# Patient Record
Sex: FEMALE | Race: WHITE | NOT HISPANIC OR LATINO | Employment: OTHER | URBAN - METROPOLITAN AREA
[De-identification: names, ages, dates, MRNs, and addresses within clinical notes are randomized per-mention and may not be internally consistent; named-entity substitution may affect disease eponyms.]

---

## 2017-06-05 ENCOUNTER — RECORDS - HEALTHEAST (OUTPATIENT)
Dept: LAB | Facility: CLINIC | Age: 72
End: 2017-06-05

## 2017-06-05 LAB
CHOLEST SERPL-MCNC: 217 MG/DL
FASTING STATUS PATIENT QL REPORTED: ABNORMAL
HDLC SERPL-MCNC: 78 MG/DL
LDLC SERPL CALC-MCNC: 121 MG/DL
TRIGL SERPL-MCNC: 90 MG/DL

## 2017-11-22 ENCOUNTER — HOSPITAL ENCOUNTER (OUTPATIENT)
Dept: MAMMOGRAPHY | Facility: CLINIC | Age: 72
Discharge: HOME | End: 2017-11-22

## 2017-11-22 ENCOUNTER — COMMUNICATION - HEALTHEAST (OUTPATIENT)
Dept: SCHEDULING | Facility: CLINIC | Age: 72
End: 2017-11-22

## 2017-11-22 DIAGNOSIS — Z12.31 VISIT FOR SCREENING MAMMOGRAM: ICD-10-CM

## 2017-12-07 ENCOUNTER — COMMUNICATION - HEALTHEAST (OUTPATIENT)
Dept: SCHEDULING | Facility: CLINIC | Age: 72
End: 2017-12-07

## 2018-09-10 ENCOUNTER — RECORDS - HEALTHEAST (OUTPATIENT)
Dept: ADMINISTRATIVE | Facility: OTHER | Age: 73
End: 2018-09-10

## 2018-09-19 ENCOUNTER — COMMUNICATION - HEALTHEAST (OUTPATIENT)
Dept: TELEHEALTH | Facility: CLINIC | Age: 73
End: 2018-09-19

## 2018-09-19 ENCOUNTER — HOSPITAL ENCOUNTER (OUTPATIENT)
Dept: CARDIOLOGY | Facility: CLINIC | Age: 73
Discharge: HOME | End: 2018-09-19
Attending: FAMILY MEDICINE

## 2018-09-19 DIAGNOSIS — R07.9 CHEST PAIN AT REST: ICD-10-CM

## 2018-09-19 LAB
CV STRESS CURRENT BP HE: NORMAL
CV STRESS CURRENT HR HE: 104
CV STRESS CURRENT HR HE: 104
CV STRESS CURRENT HR HE: 106
CV STRESS CURRENT HR HE: 106
CV STRESS CURRENT HR HE: 110
CV STRESS CURRENT HR HE: 111
CV STRESS CURRENT HR HE: 112
CV STRESS CURRENT HR HE: 115
CV STRESS CURRENT HR HE: 116
CV STRESS CURRENT HR HE: 120
CV STRESS CURRENT HR HE: 123
CV STRESS CURRENT HR HE: 123
CV STRESS CURRENT HR HE: 126
CV STRESS CURRENT HR HE: 126
CV STRESS CURRENT HR HE: 127
CV STRESS CURRENT HR HE: 127
CV STRESS CURRENT HR HE: 129
CV STRESS CURRENT HR HE: 129
CV STRESS CURRENT HR HE: 71
CV STRESS CURRENT HR HE: 76
CV STRESS CURRENT HR HE: 76
CV STRESS CURRENT HR HE: 77
CV STRESS CURRENT HR HE: 77
CV STRESS CURRENT HR HE: 78
CV STRESS CURRENT HR HE: 79
CV STRESS CURRENT HR HE: 80
CV STRESS CURRENT HR HE: 93
CV STRESS CURRENT HR HE: 96
CV STRESS CURRENT HR HE: 96
CV STRESS CURRENT HR HE: 98
CV STRESS DEVIATION TIME HE: NORMAL
CV STRESS ECHO PERCENT HR HE: NORMAL
CV STRESS EXERCISE STAGE HE: NORMAL
CV STRESS FINAL RESTING BP HE: NORMAL
CV STRESS FINAL RESTING HR HE: 76
CV STRESS MAX HR HE: 132
CV STRESS MAX TREADMILL GRADE HE: 16
CV STRESS MAX TREADMILL SPEED HE: 4.2
CV STRESS PEAK DIA BP HE: NORMAL
CV STRESS PEAK SYS BP HE: NORMAL
CV STRESS PHASE HE: NORMAL
CV STRESS PROTOCOL HE: NORMAL
CV STRESS RESTING PT POSITION HE: NORMAL
CV STRESS ST DEVIATION AMOUNT HE: NORMAL
CV STRESS ST DEVIATION ELEVATION HE: NORMAL
CV STRESS ST EVELATION AMOUNT HE: NORMAL
CV STRESS TEST TYPE HE: NORMAL
CV STRESS TOTAL STAGE TIME MIN 1 HE: NORMAL
ECHO EJECTION FRACTION ESTIMATED: 60 %
STRESS ECHO BASELINE BP: NORMAL
STRESS ECHO BASELINE HR: 78
STRESS ECHO CALCULATED PERCENT HR: 90 %
STRESS ECHO LAST STRESS BP: NORMAL
STRESS ECHO LAST STRESS HR: 129
STRESS ECHO POST ESTIMATED WORKLOAD: 10.7
STRESS ECHO POST EXERCISE DUR MIN: 9
STRESS ECHO POST EXERCISE DUR SEC: 15
STRESS ECHO TARGET HR: 125

## 2018-10-24 ENCOUNTER — RECORDS - HEALTHEAST (OUTPATIENT)
Dept: LAB | Facility: CLINIC | Age: 73
End: 2018-10-24

## 2018-10-24 LAB
ALBUMIN SERPL-MCNC: 3.9 G/DL (ref 3.5–5)
ALP SERPL-CCNC: 78 U/L (ref 45–120)
ALT SERPL W P-5'-P-CCNC: 15 U/L (ref 0–45)
ANION GAP SERPL CALCULATED.3IONS-SCNC: 7 MMOL/L (ref 5–18)
AST SERPL W P-5'-P-CCNC: 21 U/L (ref 0–40)
BILIRUB SERPL-MCNC: 0.9 MG/DL (ref 0–1)
BUN SERPL-MCNC: 12 MG/DL (ref 8–28)
CALCIUM SERPL-MCNC: 9.8 MG/DL (ref 8.5–10.5)
CHLORIDE BLD-SCNC: 95 MMOL/L (ref 98–107)
CHOLEST SERPL-MCNC: 226 MG/DL
CO2 SERPL-SCNC: 30 MMOL/L (ref 22–31)
CREAT SERPL-MCNC: 0.66 MG/DL (ref 0.6–1.1)
FASTING STATUS PATIENT QL REPORTED: YES
GFR SERPL CREATININE-BSD FRML MDRD: >60 ML/MIN/1.73M2
GLUCOSE BLD-MCNC: 105 MG/DL (ref 70–125)
HDLC SERPL-MCNC: 88 MG/DL
LDLC SERPL CALC-MCNC: 117 MG/DL
POTASSIUM BLD-SCNC: 4.2 MMOL/L (ref 3.5–5)
PROT SERPL-MCNC: 6.7 G/DL (ref 6–8)
SODIUM SERPL-SCNC: 132 MMOL/L (ref 136–145)
TRIGL SERPL-MCNC: 106 MG/DL

## 2018-12-03 ENCOUNTER — HOSPITAL ENCOUNTER (OUTPATIENT)
Dept: MAMMOGRAPHY | Facility: CLINIC | Age: 73
Discharge: HOME | End: 2018-12-03
Attending: FAMILY MEDICINE

## 2018-12-03 ENCOUNTER — COMMUNICATION - HEALTHEAST (OUTPATIENT)
Dept: TELEHEALTH | Facility: CLINIC | Age: 73
End: 2018-12-03

## 2018-12-03 DIAGNOSIS — Z12.31 VISIT FOR SCREENING MAMMOGRAM: ICD-10-CM

## 2019-06-24 ASSESSMENT — MIFFLIN-ST. JEOR: SCORE: 1319.15

## 2019-06-28 ENCOUNTER — RECORDS - HEALTHEAST (OUTPATIENT)
Dept: LAB | Facility: CLINIC | Age: 74
End: 2019-06-28

## 2019-06-28 LAB
ANION GAP SERPL CALCULATED.3IONS-SCNC: 9 MMOL/L (ref 5–18)
BUN SERPL-MCNC: 10 MG/DL (ref 8–28)
CALCIUM SERPL-MCNC: 9.7 MG/DL (ref 8.5–10.5)
CHLORIDE BLD-SCNC: 96 MMOL/L (ref 98–107)
CO2 SERPL-SCNC: 27 MMOL/L (ref 22–31)
CREAT SERPL-MCNC: 0.66 MG/DL (ref 0.6–1.1)
GFR SERPL CREATININE-BSD FRML MDRD: >60 ML/MIN/1.73M2
GLUCOSE BLD-MCNC: 85 MG/DL (ref 70–125)
POTASSIUM BLD-SCNC: 3.9 MMOL/L (ref 3.5–5)
SODIUM SERPL-SCNC: 132 MMOL/L (ref 136–145)

## 2019-07-01 ENCOUNTER — RECORDS - HEALTHEAST (OUTPATIENT)
Dept: ADMINISTRATIVE | Facility: OTHER | Age: 74
End: 2019-07-01

## 2019-07-05 ENCOUNTER — RECORDS - HEALTHEAST (OUTPATIENT)
Dept: ADMINISTRATIVE | Facility: OTHER | Age: 74
End: 2019-07-05

## 2019-07-09 ASSESSMENT — MIFFLIN-ST. JEOR: SCORE: 1227.99

## 2019-07-11 ENCOUNTER — ANESTHESIA - HEALTHEAST (OUTPATIENT)
Dept: SURGERY | Facility: CLINIC | Age: 74
End: 2019-07-11

## 2019-07-11 ENCOUNTER — RECORDS - HEALTHEAST (OUTPATIENT)
Dept: ADMINISTRATIVE | Facility: OTHER | Age: 74
End: 2019-07-11

## 2019-07-12 ENCOUNTER — SURGERY - HEALTHEAST (OUTPATIENT)
Dept: SURGERY | Facility: CLINIC | Age: 74
End: 2019-07-12

## 2019-07-12 ASSESSMENT — MIFFLIN-ST. JEOR: SCORE: 1205.76

## 2019-07-14 ASSESSMENT — MIFFLIN-ST. JEOR: SCORE: 1205.76

## 2019-12-17 ENCOUNTER — HOSPITAL ENCOUNTER (OUTPATIENT)
Dept: MAMMOGRAPHY | Facility: CLINIC | Age: 74
Discharge: HOME | End: 2019-12-17
Attending: FAMILY MEDICINE

## 2019-12-17 DIAGNOSIS — Z12.31 VISIT FOR SCREENING MAMMOGRAM: ICD-10-CM

## 2020-05-20 ENCOUNTER — RECORDS - HEALTHEAST (OUTPATIENT)
Dept: ADMINISTRATIVE | Facility: OTHER | Age: 75
End: 2020-05-20

## 2020-07-17 ENCOUNTER — RECORDS - HEALTHEAST (OUTPATIENT)
Dept: LAB | Facility: CLINIC | Age: 75
End: 2020-07-17

## 2020-07-17 LAB
ANION GAP SERPL CALCULATED.3IONS-SCNC: 10 MMOL/L (ref 5–18)
BUN SERPL-MCNC: 11 MG/DL (ref 8–28)
CALCIUM SERPL-MCNC: 9.3 MG/DL (ref 8.5–10.5)
CHLORIDE BLD-SCNC: 101 MMOL/L (ref 98–107)
CHOLEST SERPL-MCNC: 217 MG/DL
CO2 SERPL-SCNC: 24 MMOL/L (ref 22–31)
CREAT SERPL-MCNC: 0.71 MG/DL (ref 0.6–1.1)
FASTING STATUS PATIENT QL REPORTED: NO
GFR SERPL CREATININE-BSD FRML MDRD: >60 ML/MIN/1.73M2
GLUCOSE BLD-MCNC: 114 MG/DL (ref 70–125)
HDLC SERPL-MCNC: 81 MG/DL
LDLC SERPL CALC-MCNC: 114 MG/DL
POTASSIUM BLD-SCNC: 4.5 MMOL/L (ref 3.5–5)
SODIUM SERPL-SCNC: 135 MMOL/L (ref 136–145)
TRIGL SERPL-MCNC: 110 MG/DL

## 2020-10-21 ENCOUNTER — RECORDS - HEALTHEAST (OUTPATIENT)
Dept: LAB | Facility: CLINIC | Age: 75
End: 2020-10-21

## 2020-10-21 LAB — PTH-INTACT SERPL-MCNC: 42 PG/ML (ref 10–86)

## 2020-10-22 LAB — 25(OH)D3 SERPL-MCNC: 44 NG/ML (ref 30–80)

## 2020-12-17 ENCOUNTER — RECORDS - HEALTHEAST (OUTPATIENT)
Dept: SCHEDULING | Facility: CLINIC | Age: 75
End: 2020-12-17

## 2020-12-17 DIAGNOSIS — Z12.31 VISIT FOR SCREENING MAMMOGRAM: ICD-10-CM

## 2021-05-24 ENCOUNTER — RECORDS - HEALTHEAST (OUTPATIENT)
Dept: ADMINISTRATIVE | Facility: CLINIC | Age: 76
End: 2021-05-24

## 2021-05-25 ENCOUNTER — RECORDS - HEALTHEAST (OUTPATIENT)
Dept: ADMINISTRATIVE | Facility: CLINIC | Age: 76
End: 2021-05-25

## 2021-05-26 ENCOUNTER — RECORDS - HEALTHEAST (OUTPATIENT)
Dept: ADMINISTRATIVE | Facility: CLINIC | Age: 76
End: 2021-05-26

## 2021-05-30 NOTE — ANESTHESIA PROCEDURE NOTES
Spinal Block    Patient location during procedure: OR  Start time: 7/12/2019 10:32 AM  End time: 7/12/2019 10:37 AM  Reason for block: primary anesthetic    Staffing:  Performing  Anesthesiologist: Erasto Menendez MD    Preanesthetic Checklist  Completed: patient identified, risks, benefits, and alternatives discussed, timeout performed, consent obtained, airway assessed, oxygen available, suction available, emergency drugs available and hand hygiene performed  Spinal Block  Patient position: sitting  Prep: ChloraPrep  Patient monitoring: heart rate, cardiac monitor, continuous pulse ox and blood pressure  Approach: midline  Location: L3-4  Injection technique: single-shot  Needle type: pencil-tip   Needle gauge: 24 G

## 2021-05-30 NOTE — ANESTHESIA CARE TRANSFER NOTE
Last vitals:   Vitals:    07/12/19 1222   BP: 152/67   Pulse: 88   Resp: 14   Temp: 37  C (98.6  F)   SpO2: 98%     Patient's level of consciousness is awake  Spontaneous respirations: yes  Maintains airway independently: yes  Dentition unchanged: yes  Oropharynx: oropharynx clear of all foreign objects    QCDR Measures:  ASA# 20 - Surgical Safety Checklist: WHO surgical safety checklist completed prior to induction    PQRS# 430 - Adult PONV Prevention: 4558F - Pt received => 2 anti-emetic agents (different classes) preop & intraop  ASA# 8 - Peds PONV Prevention: NA - Not pediatric patient, not GA or 2 or more risk factors NOT present  PQRS# 424 - Trinity-op Temp Management: 4559F - At least one body temp DOCUMENTED => 35.5C or 95.9F within required timeframe  PQRS# 426 - PACU Transfer Protocol: - Transfer of care checklist used  ASA# 14 - Acute Post-op Pain: ASA14B - Patient did NOT experience pain >= 7 out of 10

## 2021-05-30 NOTE — ANESTHESIA PREPROCEDURE EVALUATION
Anesthesia Evaluation      Patient summary reviewed   No history of anesthetic complications     Airway   Mallampati: I  Neck ROM: full   Pulmonary - negative ROS and normal exam                          Cardiovascular - negative ROS and normal exam  (+) hypertension well controlled, , hypercholesterolemia,      Neuro/Psych - negative ROS     Endo/Other - negative ROS   (+) arthritis,      GI/Hepatic/Renal - negative ROS           Dental - normal exam                        Anesthesia Plan  Planned anesthetic: spinal    ASA 2     Anesthetic plan and risks discussed with: patient    Post-op plan: routine recovery

## 2021-05-30 NOTE — ANESTHESIA POSTPROCEDURE EVALUATION
Patient: Isela Seo  RIGHT DIRECT ANTERIOR TOTAL HIP ARTHROPLASTY  Anesthesia type: spinal    Patient location: PACU  Last vitals:   Vitals Value Taken Time   /63 7/12/2019  1:00 PM   Temp 37.2  C (98.9  F) 7/12/2019 12:50 PM   Pulse 80 7/12/2019  1:02 PM   Resp 15 7/12/2019  1:02 PM   SpO2 94 % 7/12/2019  1:02 PM   Vitals shown include unvalidated device data.  Post vital signs: stable  Level of consciousness: awake and responds to simple questions  Post-anesthesia pain: pain controlled  Post-anesthesia nausea and vomiting: no  Pulmonary: unassisted, return to baseline  Cardiovascular: stable and blood pressure at baseline  Hydration: adequate  Anesthetic events: no    QCDR Measures:  ASA# 11 - Trinity-op Cardiac Arrest: ASA11B - Patient did NOT experience unanticipated cardiac arrest  ASA# 12 - Trinity-op Mortality Rate: ASA12B - Patient did NOT die  ASA# 13 - PACU Re-Intubation Rate: NA - No ETT / LMA used for case  ASA# 10 - Composite Anes Safety: ASA10A - No serious adverse event    Additional Notes:

## 2021-06-01 ENCOUNTER — RECORDS - HEALTHEAST (OUTPATIENT)
Dept: ADMINISTRATIVE | Facility: CLINIC | Age: 76
End: 2021-06-01

## 2021-06-02 ENCOUNTER — RECORDS - HEALTHEAST (OUTPATIENT)
Dept: ADMINISTRATIVE | Facility: CLINIC | Age: 76
End: 2021-06-02

## 2021-06-03 VITALS — HEIGHT: 65 IN | WEIGHT: 156.5 LBS | BODY MASS INDEX: 26.08 KG/M2

## 2021-06-16 PROBLEM — M16.9 HIP OSTEOARTHRITIS: Status: ACTIVE | Noted: 2019-07-12

## 2021-07-13 ENCOUNTER — RECORDS - HEALTHEAST (OUTPATIENT)
Dept: ADMINISTRATIVE | Facility: CLINIC | Age: 76
End: 2021-07-13

## 2021-07-21 ENCOUNTER — RECORDS - HEALTHEAST (OUTPATIENT)
Dept: ADMINISTRATIVE | Facility: CLINIC | Age: 76
End: 2021-07-21

## 2021-07-22 ENCOUNTER — RECORDS - HEALTHEAST (OUTPATIENT)
Dept: SCHEDULING | Facility: CLINIC | Age: 76
End: 2021-07-22

## 2021-07-22 DIAGNOSIS — Z12.31 OTHER SCREENING MAMMOGRAM: ICD-10-CM

## 2021-09-13 ENCOUNTER — LAB REQUISITION (OUTPATIENT)
Dept: LAB | Facility: CLINIC | Age: 76
End: 2021-09-13
Payer: COMMERCIAL

## 2021-09-13 DIAGNOSIS — E78.5 HYPERLIPIDEMIA, UNSPECIFIED: ICD-10-CM

## 2021-09-13 DIAGNOSIS — I10 ESSENTIAL (PRIMARY) HYPERTENSION: ICD-10-CM

## 2021-09-13 LAB
ANION GAP SERPL CALCULATED.3IONS-SCNC: 9 MMOL/L (ref 5–18)
BUN SERPL-MCNC: 12 MG/DL (ref 8–28)
CALCIUM SERPL-MCNC: 9.6 MG/DL (ref 8.5–10.5)
CHLORIDE BLD-SCNC: 100 MMOL/L (ref 98–107)
CHOLEST SERPL-MCNC: 211 MG/DL
CO2 SERPL-SCNC: 26 MMOL/L (ref 22–31)
CREAT SERPL-MCNC: 0.72 MG/DL (ref 0.6–1.1)
GFR SERPL CREATININE-BSD FRML MDRD: 82 ML/MIN/1.73M2
GLUCOSE BLD-MCNC: 103 MG/DL (ref 70–125)
HDLC SERPL-MCNC: 87 MG/DL
LDLC SERPL CALC-MCNC: 102 MG/DL
POTASSIUM BLD-SCNC: 4.2 MMOL/L (ref 3.5–5)
SODIUM SERPL-SCNC: 135 MMOL/L (ref 136–145)
TRIGL SERPL-MCNC: 109 MG/DL

## 2021-09-13 PROCEDURE — 80061 LIPID PANEL: CPT | Mod: ORL | Performed by: FAMILY MEDICINE

## 2021-09-13 PROCEDURE — 80048 BASIC METABOLIC PNL TOTAL CA: CPT | Mod: ORL | Performed by: FAMILY MEDICINE

## 2021-09-13 PROCEDURE — 36415 COLL VENOUS BLD VENIPUNCTURE: CPT | Mod: ORL | Performed by: FAMILY MEDICINE

## 2022-08-13 ENCOUNTER — OFFICE VISIT (OUTPATIENT)
Dept: FAMILY MEDICINE | Facility: CLINIC | Age: 77
End: 2022-08-13
Payer: COMMERCIAL

## 2022-08-13 VITALS
BODY MASS INDEX: 24.13 KG/M2 | WEIGHT: 145 LBS | RESPIRATION RATE: 20 BRPM | SYSTOLIC BLOOD PRESSURE: 98 MMHG | TEMPERATURE: 98.8 F | OXYGEN SATURATION: 97 % | HEART RATE: 81 BPM | DIASTOLIC BLOOD PRESSURE: 56 MMHG

## 2022-08-13 DIAGNOSIS — H61.21 IMPACTED CERUMEN OF RIGHT EAR: Primary | ICD-10-CM

## 2022-08-13 PROCEDURE — 69209 REMOVE IMPACTED EAR WAX UNI: CPT | Mod: RT | Performed by: PREVENTIVE MEDICINE

## 2022-08-13 RX ORDER — SERTRALINE HYDROCHLORIDE 25 MG/1
25 TABLET, FILM COATED ORAL DAILY
COMMUNITY
Start: 2022-05-03

## 2022-08-13 RX ORDER — LOSARTAN POTASSIUM 50 MG/1
50 TABLET ORAL DAILY
COMMUNITY
Start: 2022-05-03

## 2022-08-13 NOTE — PROGRESS NOTES
Assessment & Plan     (H61.21) Impacted cerumen of right ear  (primary encounter diagnosis)  Consider debrox in the future    MA used ear irrigation to remove cerumen without problem.  Patient tolerated procedure well.  There were no complications.       31 minutes spent outside of the procedure on the date of the encounter doing chart review, review of outside records, patient visit and documentation         No follow-ups on file.    Luis Fernando Cervantes MD  [facility]    Subjective     Isela Seo is a 77 year old year old female who presents to clinic today for the following health issues:    Patient presents with:  Cerumen Impaction: Wax removal right ear     This is a 78 yo female who has a history of recurrent wax in her ear that she needs 'flushed out'.  Has been bothering her in the right ear for the past 2 days.  Otherwise feels well.    Patient Active Problem List   Diagnosis     Hip osteoarthritis       Current Outpatient Medications   Medication     aspirin 325 MG tablet     Calcium-Vitamin D (CALCIUM 500/D PO)     fish oil-omega-3 fatty acids 1000 MG capsule     losartan (COZAAR) 50 MG tablet     Multiple Vitamin (MULTIVITAMIN) per tablet     sertraline (ZOLOFT) 25 MG tablet     simvastatin (ZOCOR) 10 MG tablet     No current facility-administered medications for this visit.       No past medical history on file.    Social History   reports that she quit smoking about 34 years ago. She quit after 20.00 years of use. She has never used smokeless tobacco. She reports current alcohol use of about 7.0 - 14.0 standard drinks of alcohol per week. She reports that she does not use drugs.    Family History   Problem Relation Age of Onset     Breast Cancer Maternal Grandmother 90.00     Breast Cancer Other        Review of Systems  Constitutional, HEENT, cardiovascular, pulmonary, GI, , musculoskeletal, neuro, skin, endocrine and psych systems are negative, except as  otherwise noted.      Objective    BP 98/56 (BP Location: Right arm, Patient Position: Sitting, Cuff Size: Adult Regular)   Pulse 81   Temp 98.8  F (37.1  C) (Oral)   Resp 20   Wt 65.8 kg (145 lb)   SpO2 97%   BMI 24.13 kg/m    Physical Exam   GENERAL: healthy, alert and no distress  EYES: Eyes grossly normal to inspection, PERRL and conjunctivae and sclerae normal  HENT: l ear canal and b TM's normal, nose and mouth without ulcers or lesions, r ear canal obstructed with cerumen and, once clear, appears normal.  NECK: no adenopathy, no asymmetry, masses, or scars and thyroid normal to palpation  RESP: lungs clear to auscultation - no rales, rhonchi or wheezes  CV: regular rate and rhythm, normal S1 S2, no S3 or S4, no murmur, click or rub, no peripheral edema and peripheral pulses strong  MS: no gross musculoskeletal defects noted, no edema  SKIN: no suspicious lesions or rashes  NEURO: Normal strength and tone, mentation intact and speech normal  PSYCH: mentation appears normal, affect normal/bright

## 2025-01-15 RX ORDER — LANOLIN ALCOHOL/MO/W.PET/CERES
1000 CREAM (GRAM) TOPICAL DAILY
COMMUNITY

## 2025-01-15 RX ORDER — DONEPEZIL HYDROCHLORIDE 10 MG/1
10 TABLET, FILM COATED ORAL AT BEDTIME
COMMUNITY

## 2025-01-15 RX ORDER — FERROUS SULFATE 325(65) MG
325 TABLET ORAL
COMMUNITY

## 2025-01-15 NOTE — H&P (VIEW-ONLY)
[facility]      Preoperative Consultation   Isela Seo   : 1945   Gender: female    Date of Encounter: 1/15/2025    Nursing Notes:   Arabella Aly CMA  1/15/2025 10:30 AM  Signed  Chief Complaint   Patient presents with     Pre-Op Exam     Pre-op DOS 25, Rt total knee replacement, [facility]. Fax [phone]       Additional visit information (chief complaint/health maintenance) shared by patient:     Health Maintenance Due   Topic Date Due     RSV vaccine for adults or pregnancy (1 - 1-dose 75+ series) Never done     Influenza for age 65+  2024     Medicare Wellness for age 65+  2024     Depression screening for age 12+  2025       Health maintenance reviewed with patient Yes    Patient presents for an in-person office visit: alone  Communication Method: Patient is active on [employer] and has been instructed that results/communications will be made via [employer]  If a phone call is needed, the preferred number is:  Mobile or Home  Home Phone [phone]   Mobile [phone]   Mobile [phone]     May we leave a detailed message at this number? Yes  Arabella Aly CMA.....1/15/2025 10:02 AM     Chief Complaint   Patient presents with     Pre-Op Exam     Pre-op DOS 25, Rt total knee replacement, [facility]. Fax [phone]       Health Maintenance Due   Topic Date Due     RSV vaccine for adults or pregnancy (1 - 1-dose 75+ series) Never done     Influenza for age 65+  2024     Medicare Wellness for age 65+  2024     Depression screening for age 12+  2025       Islea Seo is a 79 y.o. female (1945) who presents for Rt total knee replacement    Date of Surgery: 25  Surgical Specialty: Orthopedic  Harsha Marx MD - [facility]/Surgical Facility: [facility]  Surgery type: inpatient  Primary Physician: Selene  Johanna Aly, [facility] ....................  1/15/2025   10:03 AM          History of Present Illness     78 y/o F with h/o HTN, HLP, dementia, lung cancer s/p right lower lobectomy, previous breast cancer, osteoporosis and osteoarthritis that presents for pre-operative evaluation for knee replacement surgery.    Osteoarthritis: has progressed to the point it is limiting her activity. Previously was golfing and walking almost 3 miles a day in the summer, but was not able to do this last summer and wants to stay active. Tried conservative management with injections and was not very helpful.    Dementia: Following with Dr. Walters. Diagnosed with MCI in 2022. SLUMS in 2022 was 25/30 -> down to 19/30 in Feb of 2024 and had follow-up neuropsych testing in March that diagnosed major neurocognitive disorder secondary to alzheimer's disease. She was started on donepezil in 2024.     HTN: is currently taking losartan 50 mg once a day. No side effects.      HLP: is currently taking simvastatin 40 mg once a day.      Lung changes: history of lung cancer s/p right lower lobe lobectomy in 2002. She was seen in the [facility] 5/2020 and had a CT scan that showed 2 ground glass opacities in the left upper lung that were a little larger than previous scans. Following with Dr. Sparks. Last CT in 10/2024 with some enlargement suspicious for a slow-growing adenocarcinoma. Plans f/u in April.    URI symptoms: Cough, sniffles, voice deeper for last few days. No fevers. Doesn't really feel sick. No sick contacts.    Advanced Care Planning: requesting to fill out a POLST form today. Just completed an advanced directive and copy given for the chart. Discussion with both Isela and her daughter, Brittany, who is her health care power of . Isela would like to live as long as possible as long as she has good quality of life. She chooses to be DNR/I. She would be ok with non-invasive respiratory support such  as CPAP or Bipap if it was felt she may have a reversible condition and could return to her previous level of functioning. Limited trial of a feeding tube again if reversible condition. Ok with IV and PO antibiotics. She does not want to do treatments or procedures that would cause her to linger with a likely terminal condition. Attempted serious illness questions, but was difficult with dementia.     Review of Systems   A comprehensive review of systems was negative except for items noted in HPI.    Patient Active Problem List   Diagnosis Code     Polyp of colon K63.5     Hip osteoarthritis M16.9     Hemorrhoids K64.9     Diverticular disease of large intestine K57.30     HTN (hypertension) I10     Mixed hyperlipidemia E78.2     Age-related osteoporosis without current pathological fracture M81.0     History of lung cancer Z85.118     History of breast cancer Z85.3     Alzheimer's dementia without behavioral disturbance (HC) G30.9, F02.80     Major neurocognitive disorder (HC) F03.90     Skin cancer C44.90     Current Outpatient Medications   Medication Sig     donepeziL (ARICEPT) 10 mg tablet Take 1 Tablet (10 mg) by mouth at bedtime.     losartan (COZAAR) 50 mg tablet Take 1 Tablet (50 mg) by mouth once daily.     medication order composer Vitamin B12, calcium & iron     sertraline (ZOLOFT) 25 mg tablet Take 1 Tablet (25 mg) by mouth once daily.     simvastatin (ZOCOR) 20 mg tablet Take 1 Tablet (20 mg) by mouth at bedtime.     No current facility-administered medications for this visit.     Medications have been reviewed by me and are current to the best of my knowledge and ability.     No Known Allergies  Past Surgical History:   . Laterality Date     (IA) FL FREEING BOWEL ADHESION ENTEROLYSIS  1999     BREAST BIOPSY       CATARACT EXTRACTION Bilateral 2011     HIP ARTHROPLASTY Right 07/15/2019     KNEE CARTILAGE SURGERY Left 10/14/2010     LAPAROSCOPIC OVARIAN CYSTECTOMY      for infertility     LOBECTOMY  "Right 2002    right lower lobe for lung cancer     AK TONSILLECTOMY & ADENOIDECTOMY <AGE 12       Social History     Tobacco Use     Smoking status: Former     Types: Cigarettes     Smokeless tobacco: Never     Tobacco comments:     Quit 1985   Vaping Use     Vaping status: Never Used   Substance Use Topics     Alcohol use: Yes     Alcohol/week: 2.0 standard drinks of alcohol     Types: 2 Cans of beer per week     Comment: 2 beer every night      Drug use: No     Family History   Problem Relation Age of Onset     Stroke Mother      Hypertension Mother      Gout Father      Arthritis Father      Kidney failure Father      Arthritis Sister      Cancer-breast Maternal Grandmother      PAST DIFFICULTY WITH ANESTHESIA: Yes, personal hx of delirium after hip surgery      Physical Exam   /70 (Cuff Site: Left Arm, Position: Sitting, Cuff Size: Adult Regular)   Pulse 72   Temp 97.6  F (36.4  C)   Ht 1.651 m (5' 5\")   Wt 70.3 kg (155 lb)   SpO2 99%   BMI 25.79 kg/m   Body mass index is 25.79 kg/m .  General Appearance: Pleasant, alert, appropriate appearance for age. No acute distress  Head Exam: Normocephalic, without obvious abnormality.  Eye Exam: Normal external eye, conjunctiva, lids, cornea. STEPHANIE.  Ear Exam: Normal TM's bilaterally. Normal auditory canals and external ears. Non-tender.  Nose Exam: Normal external nose, mucous membranes, and septum.  OroPharynx Exam: Dental hygiene adequate. Normal buccal mucosa. Normal pharynx.  Neck Exam: Supple, no masses or nodes.  Chest/Respiratory Exam: Normal chest wall and respirations. Clear to auscultation.  Cardiovascular Exam: Regular rate and rhythm. S1, S2, 3/6 systolic murmur loudest at RUSB.  Gastrointestinal Exam: Soft, nontender, no abnormal masses or organomegaly.  Lymphatic Exam: Normal.  Musculoskeletal Exam: Back is straight and non-tender, full ROM of upper and lower extremities.  Skin: no rash or abnormalities  Neurologic Exam: Nonfocal; normal gross " motor movement, tone, and coordination. No tremor.  Psychiatric Exam: Alert and oriented, appropriate affect.     Assessment / Plan     The Pre-Op Tool    Recommendations      Intermediate Risk Procedure    Risk of CV Complication (RCRI)  0.5%    Current Cardiac Status  Good exertional capacity ( > 4 mets )    Cardiac History  No history of coronary artery disease           Labs  HGB within last 30 days  Potassium within last 30 days  EKG  Baseline EKG within the last 12 months  CXR  Not indicated    Stress Testing  Not indicated    * Testing recommendations are intended to assist, but not direct, clinical decisions.           Type & Screen should be obtained by Anesthesia only if the risk of transfusion is > 5% for the procedure         Hold Losartan (Cozaar) the evening before and/or morning of the procedure.  Take your other medications as usual prior to the procedure  Hold vitamins and/or supplements for 1 week prior to the procedure  Hold fish oil for 2 weeks prior to the procedure  Okay to take Acetaminophen (Tylenol) up until the procedure  Hold / avoid NSAIDs (e.g. ibuprofen, naproxen) prior to procedure: 2 days for ibuprofen (Advil) and 4 days for naproxen (Aleve).    * Medication recommendations are not intended to be exhaustive; they are limited to common medications that are potentially dangerous if incorrectly managed          Labs  * Data supports elimination of  routine  laboratory testing in favor of focused,  indicated  testing based on medical co-morbidities. A 2009 study randomized 1061 patients undergoing ambulatory, non-cataract surgery to routine or to indicated testing. Perioperative adverse events were similar (Anesthesia & Analgesia 2009;108:467-75; Anesthesiol. Clin. 2016 Mar;34(1):43-58).  Stress Testing  * The current ACC/AHA guideline states that 'non-invasive stress testing is not useful for patients [with no clinical risk factors] undergoing noncardiac surgery' (JACC.  2014;6421);e1-76.).  RAAS Antagonist  * Hypotension during anesthesia is associated with continuing renin-angiotensin system (RAAS) antagonist. While it is unclear if holding RAAS antagonists reduces postoperative complications, in most circumstances our experts recommend holding RAAS antagonist 24 hours prior to surgery. (Postgrad Med J 2011;87:472-81; Anest 2017;126:16-27; BMC Anesthesiol 2018;18:26.)     Session ID: [id number]  Endnotes and bibliography available upon request: [email or web address]  Labs: yes  ECG: yes - NSR, RBBB, unchanged from 2018    ICD-10-CM    1. Preoperative examination  Z01.818 CBC W PLT NO DIFF      2. Primary osteoarthritis of right knee  M17.11 CBC W PLT NO DIFF      3. Alzheimer's dementia without behavioral disturbance (HC)  G30.9 Dementia increases her risk for delirium in the post-operative period. Some recommendations to help minimize this, include:  - frequent orientation to time and place  - adequate pain control  - maintaining a consistent sleep schedule  - early immobilization  - addressing any sensory impairments (hearing/vision)  - minimize unnecessary medication  - Family involvement    F02.80       4. HTN (hypertension)  I10 BASIC METABOLIC PANEL     DC READING EKG - NO CHARGE, COMP ONLY     EKG 12 LEAD     DC ECG ROUTINE ECG W/LEAST 12 LDS W/I&R     losartan (COZAAR) 50 mg tablet      5. Anxiety  F41.9 sertraline (ZOLOFT) 25 mg tablet      6. Mixed hyperlipidemia  E78.2 simvastatin (ZOCOR) 20 mg tablet      7. Major neurocognitive disorder (HC)  F03.90 donepeziL (ARICEPT) 10 mg tablet      8. Heart murmur  R01.1 Heart murmur today louder than in the past. Previous stress ECHO in 2018 without any valvular abnormalities. I recommend we obtain an echocardiogram prior to surgery to make sure she does not have any significant valvular disease that has developed that may complicate the surgery.  ECHO TRANSTHORACIC COMPLETE      9.  Rhinorrhea: Has some very mild possible viral URI symptoms today. She does not feel sick. It is possible these are related to the cold weather as well. Will monitor symptoms and if improving, ok for surgery. If worsening or not improving, then would push back surgery.   10. Advanced Care Planing - Serious illness conversation done to extent able with dementia and POLST form completed.    Total time preparing to see this patient, face-to-face time, and coordinating care time on the same calendar date: 54 minutes. 22 minutes of this time was spent on advanced care planning and completing a POLST.    Patient is cleared, pending tests ordered today, for planned procedure.   Electronically Signed by:     Johanna Morton MD  Internal Medicine/Pediatrics  [facility]    1/15/2025

## 2025-01-16 RX ORDER — SIMVASTATIN 20 MG
20 TABLET ORAL AT BEDTIME
COMMUNITY

## 2025-01-16 RX ORDER — SENNOSIDES 8.6 MG
650 CAPSULE ORAL DAILY
COMMUNITY

## 2025-01-16 NOTE — PROGRESS NOTES
Planning to discharge home on POD 1 in the morning with her daughter helping her and staying with her after surgery.       01/16/25 [id number]   Discharge Planning   Patient/Family Anticipates Transition to home with family  (outpatient PT arranged at [facility])   Concerns to be Addressed all concerns addressed in this encounter   Living Arrangements   People in Home alone   Type of Residence Private Residence   Is your private residence a single family home or apartment? Apartment   Number of Stairs, Within Home, Primary none  (elevator)   Once home, are you able to live on one level? Yes   Which level? Main Level   Bathroom Shower/Tub Walk-in shower   Equipment Currently Used at Home grab bar, toilet;shower chair;cane, straight  (Has a walker at home)   Support System   Support Systems Children  (daughter, Brittany Seo, will be staying with her for a week after surgery)   Do you have someone available to stay with you one or two nights once you are home? Yes   Education   Patient attended total joint pre-op class/received pre-op teaching  email/phone call

## 2025-01-22 ENCOUNTER — ANESTHESIA EVENT (OUTPATIENT)
Dept: SURGERY | Facility: CLINIC | Age: 80
End: 2025-01-22
Payer: COMMERCIAL

## 2025-01-23 ENCOUNTER — DOCUMENTATION ONLY (OUTPATIENT)
Dept: OTHER | Facility: CLINIC | Age: 80
End: 2025-01-23
Payer: COMMERCIAL

## 2025-01-23 ENCOUNTER — APPOINTMENT (OUTPATIENT)
Dept: PHYSICAL THERAPY | Facility: CLINIC | Age: 80
End: 2025-01-23
Attending: ORTHOPAEDIC SURGERY
Payer: COMMERCIAL

## 2025-01-23 ENCOUNTER — HOSPITAL ENCOUNTER (OUTPATIENT)
Facility: CLINIC | Age: 80
End: 2025-01-23
Attending: ORTHOPAEDIC SURGERY | Admitting: ORTHOPAEDIC SURGERY
Payer: COMMERCIAL

## 2025-01-23 ENCOUNTER — ANESTHESIA (OUTPATIENT)
Dept: SURGERY | Facility: CLINIC | Age: 80
End: 2025-01-23
Payer: COMMERCIAL

## 2025-01-23 VITALS
DIASTOLIC BLOOD PRESSURE: 66 MMHG | HEART RATE: 71 BPM | HEIGHT: 65 IN | RESPIRATION RATE: 17 BRPM | BODY MASS INDEX: 25.83 KG/M2 | OXYGEN SATURATION: 93 % | WEIGHT: 155 LBS | TEMPERATURE: 98.3 F | SYSTOLIC BLOOD PRESSURE: 141 MMHG

## 2025-01-23 DIAGNOSIS — M17.11 PRIMARY OSTEOARTHRITIS OF RIGHT KNEE: Primary | ICD-10-CM

## 2025-01-23 LAB
ANION GAP SERPL CALCULATED.3IONS-SCNC: 10 MMOL/L (ref 7–15)
BUN SERPL-MCNC: 8.8 MG/DL (ref 8–23)
CALCIUM SERPL-MCNC: 9.1 MG/DL (ref 8.8–10.4)
CHLORIDE SERPL-SCNC: 98 MMOL/L (ref 98–107)
CREAT SERPL-MCNC: 0.56 MG/DL (ref 0.51–0.95)
EGFRCR SERPLBLD CKD-EPI 2021: >90 ML/MIN/1.73M2
GLUCOSE SERPL-MCNC: 143 MG/DL (ref 70–99)
HCO3 SERPL-SCNC: 25 MMOL/L (ref 22–29)
HOLD SPECIMEN: NORMAL
MAGNESIUM SERPL-MCNC: 2 MG/DL (ref 1.7–2.3)
POTASSIUM SERPL-SCNC: 4.2 MMOL/L (ref 3.4–5.3)
SODIUM SERPL-SCNC: 133 MMOL/L (ref 135–145)

## 2025-01-23 PROCEDURE — 250N000011 HC RX IP 250 OP 636: Performed by: ANESTHESIOLOGY

## 2025-01-23 PROCEDURE — 258N000003 HC RX IP 258 OP 636: Performed by: ANESTHESIOLOGY

## 2025-01-23 PROCEDURE — 250N000013 HC RX MED GY IP 250 OP 250 PS 637: Performed by: PHYSICIAN ASSISTANT

## 2025-01-23 PROCEDURE — 80048 BASIC METABOLIC PNL TOTAL CA: CPT | Performed by: FAMILY MEDICINE

## 2025-01-23 PROCEDURE — 370N000017 HC ANESTHESIA TECHNICAL FEE, PER MIN: Performed by: ORTHOPAEDIC SURGERY

## 2025-01-23 PROCEDURE — 250N000011 HC RX IP 250 OP 636: Performed by: PHYSICIAN ASSISTANT

## 2025-01-23 PROCEDURE — 250N000009 HC RX 250: Performed by: PHYSICIAN ASSISTANT

## 2025-01-23 PROCEDURE — 97161 PT EVAL LOW COMPLEX 20 MIN: CPT | Mod: GP

## 2025-01-23 PROCEDURE — 250N000011 HC RX IP 250 OP 636: Performed by: ORTHOPAEDIC SURGERY

## 2025-01-23 PROCEDURE — 250N000013 HC RX MED GY IP 250 OP 250 PS 637: Performed by: FAMILY MEDICINE

## 2025-01-23 PROCEDURE — 999N000141 HC STATISTIC PRE-PROCEDURE NURSING ASSESSMENT: Performed by: ORTHOPAEDIC SURGERY

## 2025-01-23 PROCEDURE — 83735 ASSAY OF MAGNESIUM: CPT | Performed by: FAMILY MEDICINE

## 2025-01-23 PROCEDURE — 250N000013 HC RX MED GY IP 250 OP 250 PS 637: Performed by: ORTHOPAEDIC SURGERY

## 2025-01-23 PROCEDURE — C1713 ANCHOR/SCREW BN/BN,TIS/BN: HCPCS | Performed by: ORTHOPAEDIC SURGERY

## 2025-01-23 PROCEDURE — 258N000003 HC RX IP 258 OP 636: Performed by: NURSE ANESTHETIST, CERTIFIED REGISTERED

## 2025-01-23 PROCEDURE — 710N000010 HC RECOVERY PHASE 1, LEVEL 2, PER MIN: Performed by: ORTHOPAEDIC SURGERY

## 2025-01-23 PROCEDURE — C1776 JOINT DEVICE (IMPLANTABLE): HCPCS | Performed by: ORTHOPAEDIC SURGERY

## 2025-01-23 PROCEDURE — 82565 ASSAY OF CREATININE: CPT | Performed by: FAMILY MEDICINE

## 2025-01-23 PROCEDURE — 272N000001 HC OR GENERAL SUPPLY STERILE: Performed by: ORTHOPAEDIC SURGERY

## 2025-01-23 PROCEDURE — 250N000009 HC RX 250: Performed by: NURSE ANESTHETIST, CERTIFIED REGISTERED

## 2025-01-23 PROCEDURE — 36415 COLL VENOUS BLD VENIPUNCTURE: CPT | Performed by: FAMILY MEDICINE

## 2025-01-23 PROCEDURE — 99222 1ST HOSP IP/OBS MODERATE 55: CPT | Performed by: FAMILY MEDICINE

## 2025-01-23 PROCEDURE — 64447 NJX AA&/STRD FEMORAL NRV IMG: CPT | Mod: RT,XU

## 2025-01-23 PROCEDURE — 97116 GAIT TRAINING THERAPY: CPT | Mod: GP

## 2025-01-23 PROCEDURE — 360N000077 HC SURGERY LEVEL 4, PER MIN: Performed by: ORTHOPAEDIC SURGERY

## 2025-01-23 PROCEDURE — 250N000009 HC RX 250: Performed by: ORTHOPAEDIC SURGERY

## 2025-01-23 PROCEDURE — 97110 THERAPEUTIC EXERCISES: CPT | Mod: GP

## 2025-01-23 PROCEDURE — 250N000011 HC RX IP 250 OP 636: Performed by: NURSE ANESTHETIST, CERTIFIED REGISTERED

## 2025-01-23 PROCEDURE — 258N000003 HC RX IP 258 OP 636: Performed by: FAMILY MEDICINE

## 2025-01-23 DEVICE — IMP INSERT ARTICULAR S&N LGN CR XLPE SZ3-4 11MM 71453112: Type: IMPLANTABLE DEVICE | Site: KNEE | Status: FUNCTIONAL

## 2025-01-23 DEVICE — IMP BASEPLATE TIBIAL GENESIS II SZ 4 RT TI 71420184: Type: IMPLANTABLE DEVICE | Site: KNEE | Status: FUNCTIONAL

## 2025-01-23 DEVICE — IMP BONE CEMENT STRK SIMPLEX HV FULL DOSE 6194-1-001: Type: IMPLANTABLE DEVICE | Site: KNEE | Status: FUNCTIONAL

## 2025-01-23 DEVICE — IMPLANTABLE DEVICE: Type: IMPLANTABLE DEVICE | Site: KNEE | Status: FUNCTIONAL

## 2025-01-23 DEVICE — IMP COMP PATELLA SNR GENESIS II 9X32MM 71420576: Type: IMPLANTABLE DEVICE | Site: KNEE | Status: FUNCTIONAL

## 2025-01-23 RX ORDER — LIDOCAINE 40 MG/G
CREAM TOPICAL
Status: DISCONTINUED | OUTPATIENT
Start: 2025-01-23 | End: 2025-01-24 | Stop reason: HOSPADM

## 2025-01-23 RX ORDER — SODIUM CHLORIDE, SODIUM LACTATE, POTASSIUM CHLORIDE, CALCIUM CHLORIDE 600; 310; 30; 20 MG/100ML; MG/100ML; MG/100ML; MG/100ML
INJECTION, SOLUTION INTRAVENOUS CONTINUOUS
Status: DISCONTINUED | OUTPATIENT
Start: 2025-01-23 | End: 2025-01-23

## 2025-01-23 RX ORDER — ASPIRIN 81 MG/1
81 TABLET ORAL 2 TIMES DAILY
Qty: 80 TABLET | Refills: 0 | Status: SHIPPED | OUTPATIENT
Start: 2025-01-23 | End: 2025-03-04

## 2025-01-23 RX ORDER — FLUMAZENIL 0.1 MG/ML
0.2 INJECTION, SOLUTION INTRAVENOUS
Status: DISCONTINUED | OUTPATIENT
Start: 2025-01-23 | End: 2025-01-23 | Stop reason: HOSPADM

## 2025-01-23 RX ORDER — POLYETHYLENE GLYCOL 3350 17 G/17G
17 POWDER, FOR SOLUTION ORAL DAILY
Status: DISCONTINUED | OUTPATIENT
Start: 2025-01-24 | End: 2025-01-24 | Stop reason: HOSPADM

## 2025-01-23 RX ORDER — ONDANSETRON 2 MG/ML
INJECTION INTRAMUSCULAR; INTRAVENOUS PRN
Status: DISCONTINUED | OUTPATIENT
Start: 2025-01-23 | End: 2025-01-23

## 2025-01-23 RX ORDER — ACETAMINOPHEN 325 MG/1
975 TABLET ORAL ONCE
Status: DISCONTINUED | OUTPATIENT
Start: 2025-01-23 | End: 2025-01-23

## 2025-01-23 RX ORDER — OXYCODONE HYDROCHLORIDE 5 MG/1
5 TABLET ORAL
Status: DISCONTINUED | OUTPATIENT
Start: 2025-01-23 | End: 2025-01-23 | Stop reason: HOSPADM

## 2025-01-23 RX ORDER — DEXAMETHASONE SODIUM PHOSPHATE 4 MG/ML
4 INJECTION, SOLUTION INTRA-ARTICULAR; INTRALESIONAL; INTRAMUSCULAR; INTRAVENOUS; SOFT TISSUE
Status: DISCONTINUED | OUTPATIENT
Start: 2025-01-23 | End: 2025-01-23

## 2025-01-23 RX ORDER — ONDANSETRON 2 MG/ML
4 INJECTION INTRAMUSCULAR; INTRAVENOUS EVERY 30 MIN PRN
Status: DISCONTINUED | OUTPATIENT
Start: 2025-01-23 | End: 2025-01-23 | Stop reason: HOSPADM

## 2025-01-23 RX ORDER — FENTANYL CITRATE 50 UG/ML
25 INJECTION, SOLUTION INTRAMUSCULAR; INTRAVENOUS EVERY 5 MIN PRN
Status: DISCONTINUED | OUTPATIENT
Start: 2025-01-23 | End: 2025-01-23 | Stop reason: HOSPADM

## 2025-01-23 RX ORDER — FENTANYL CITRATE 50 UG/ML
50 INJECTION, SOLUTION INTRAMUSCULAR; INTRAVENOUS EVERY 5 MIN PRN
Status: DISCONTINUED | OUTPATIENT
Start: 2025-01-23 | End: 2025-01-23 | Stop reason: HOSPADM

## 2025-01-23 RX ORDER — MAGNESIUM HYDROXIDE 1200 MG/15ML
LIQUID ORAL PRN
Status: DISCONTINUED | OUTPATIENT
Start: 2025-01-23 | End: 2025-01-23 | Stop reason: HOSPADM

## 2025-01-23 RX ORDER — FENTANYL CITRATE 50 UG/ML
25 INJECTION, SOLUTION INTRAMUSCULAR; INTRAVENOUS
Status: DISCONTINUED | OUTPATIENT
Start: 2025-01-23 | End: 2025-01-23

## 2025-01-23 RX ORDER — TRANEXAMIC ACID 650 MG/1
1950 TABLET ORAL ONCE
Status: COMPLETED | OUTPATIENT
Start: 2025-01-23 | End: 2025-01-23

## 2025-01-23 RX ORDER — PROPOFOL 10 MG/ML
INJECTION, EMULSION INTRAVENOUS CONTINUOUS PRN
Status: DISCONTINUED | OUTPATIENT
Start: 2025-01-23 | End: 2025-01-23

## 2025-01-23 RX ORDER — LIDOCAINE 40 MG/G
CREAM TOPICAL
Status: DISCONTINUED | OUTPATIENT
Start: 2025-01-23 | End: 2025-01-23 | Stop reason: HOSPADM

## 2025-01-23 RX ORDER — SODIUM CHLORIDE, SODIUM LACTATE, POTASSIUM CHLORIDE, CALCIUM CHLORIDE 600; 310; 30; 20 MG/100ML; MG/100ML; MG/100ML; MG/100ML
INJECTION, SOLUTION INTRAVENOUS CONTINUOUS PRN
Status: DISCONTINUED | OUTPATIENT
Start: 2025-01-23 | End: 2025-01-23

## 2025-01-23 RX ORDER — HYDROMORPHONE HCL IN WATER/PF 6 MG/30 ML
0.2 PATIENT CONTROLLED ANALGESIA SYRINGE INTRAVENOUS EVERY 4 HOURS PRN
Status: DISCONTINUED | OUTPATIENT
Start: 2025-01-23 | End: 2025-01-24 | Stop reason: HOSPADM

## 2025-01-23 RX ORDER — LOSARTAN POTASSIUM 50 MG/1
50 TABLET ORAL DAILY
Status: DISCONTINUED | OUTPATIENT
Start: 2025-01-23 | End: 2025-01-24 | Stop reason: HOSPADM

## 2025-01-23 RX ORDER — ONDANSETRON 4 MG/1
4 TABLET, ORALLY DISINTEGRATING ORAL EVERY 30 MIN PRN
Status: DISCONTINUED | OUTPATIENT
Start: 2025-01-23 | End: 2025-01-23

## 2025-01-23 RX ORDER — OXYCODONE HYDROCHLORIDE 5 MG/1
5 TABLET ORAL EVERY 4 HOURS PRN
Status: DISCONTINUED | OUTPATIENT
Start: 2025-01-23 | End: 2025-01-24 | Stop reason: HOSPADM

## 2025-01-23 RX ORDER — DIPHENHYDRAMINE HCL 12.5 MG/5ML
12.5 SOLUTION ORAL EVERY 6 HOURS PRN
Status: DISCONTINUED | OUTPATIENT
Start: 2025-01-23 | End: 2025-01-24 | Stop reason: HOSPADM

## 2025-01-23 RX ORDER — ACETAMINOPHEN 325 MG/1
975 TABLET ORAL EVERY 8 HOURS
Status: DISCONTINUED | OUTPATIENT
Start: 2025-01-23 | End: 2025-01-24 | Stop reason: HOSPADM

## 2025-01-23 RX ORDER — SIMVASTATIN 20 MG
20 TABLET ORAL AT BEDTIME
Status: DISCONTINUED | OUTPATIENT
Start: 2025-01-23 | End: 2025-01-24 | Stop reason: HOSPADM

## 2025-01-23 RX ORDER — HYDROMORPHONE HCL IN WATER/PF 6 MG/30 ML
0.2 PATIENT CONTROLLED ANALGESIA SYRINGE INTRAVENOUS EVERY 5 MIN PRN
Status: DISCONTINUED | OUTPATIENT
Start: 2025-01-23 | End: 2025-01-23 | Stop reason: HOSPADM

## 2025-01-23 RX ORDER — BISACODYL 10 MG
10 SUPPOSITORY, RECTAL RECTAL DAILY PRN
Status: DISCONTINUED | OUTPATIENT
Start: 2025-01-23 | End: 2025-01-24 | Stop reason: HOSPADM

## 2025-01-23 RX ORDER — SODIUM CHLORIDE, SODIUM LACTATE, POTASSIUM CHLORIDE, CALCIUM CHLORIDE 600; 310; 30; 20 MG/100ML; MG/100ML; MG/100ML; MG/100ML
INJECTION, SOLUTION INTRAVENOUS CONTINUOUS
Status: DISCONTINUED | OUTPATIENT
Start: 2025-01-23 | End: 2025-01-23 | Stop reason: HOSPADM

## 2025-01-23 RX ORDER — KETOROLAC TROMETHAMINE 30 MG/ML
15 INJECTION, SOLUTION INTRAMUSCULAR; INTRAVENOUS EVERY 6 HOURS
Status: DISCONTINUED | OUTPATIENT
Start: 2025-01-23 | End: 2025-01-23

## 2025-01-23 RX ORDER — FENTANYL CITRATE 50 UG/ML
INJECTION, SOLUTION INTRAMUSCULAR; INTRAVENOUS PRN
Status: DISCONTINUED | OUTPATIENT
Start: 2025-01-23 | End: 2025-01-23

## 2025-01-23 RX ORDER — PROPOFOL 10 MG/ML
INJECTION, EMULSION INTRAVENOUS PRN
Status: DISCONTINUED | OUTPATIENT
Start: 2025-01-23 | End: 2025-01-23

## 2025-01-23 RX ORDER — OXYCODONE HYDROCHLORIDE 5 MG/1
10 TABLET ORAL
Status: DISCONTINUED | OUTPATIENT
Start: 2025-01-23 | End: 2025-01-23

## 2025-01-23 RX ORDER — HYDROXYZINE HYDROCHLORIDE 10 MG/1
10 TABLET, FILM COATED ORAL EVERY 6 HOURS PRN
Status: DISCONTINUED | OUTPATIENT
Start: 2025-01-23 | End: 2025-01-24 | Stop reason: HOSPADM

## 2025-01-23 RX ORDER — ONDANSETRON 2 MG/ML
4 INJECTION INTRAMUSCULAR; INTRAVENOUS EVERY 6 HOURS PRN
Status: DISCONTINUED | OUTPATIENT
Start: 2025-01-23 | End: 2025-01-24 | Stop reason: HOSPADM

## 2025-01-23 RX ORDER — BUPIVACAINE HYDROCHLORIDE 5 MG/ML
INJECTION, SOLUTION EPIDURAL; INTRACAUDAL
Status: COMPLETED | OUTPATIENT
Start: 2025-01-23 | End: 2025-01-23

## 2025-01-23 RX ORDER — PROCHLORPERAZINE MALEATE 5 MG/1
5 TABLET ORAL EVERY 6 HOURS PRN
Status: DISCONTINUED | OUTPATIENT
Start: 2025-01-23 | End: 2025-01-24 | Stop reason: HOSPADM

## 2025-01-23 RX ORDER — HYDROMORPHONE HCL IN WATER/PF 6 MG/30 ML
0.4 PATIENT CONTROLLED ANALGESIA SYRINGE INTRAVENOUS EVERY 5 MIN PRN
Status: DISCONTINUED | OUTPATIENT
Start: 2025-01-23 | End: 2025-01-23 | Stop reason: HOSPADM

## 2025-01-23 RX ORDER — NALOXONE HYDROCHLORIDE 0.4 MG/ML
0.1 INJECTION, SOLUTION INTRAMUSCULAR; INTRAVENOUS; SUBCUTANEOUS
Status: DISCONTINUED | OUTPATIENT
Start: 2025-01-23 | End: 2025-01-24 | Stop reason: HOSPADM

## 2025-01-23 RX ORDER — ONDANSETRON 4 MG/1
4 TABLET, ORALLY DISINTEGRATING ORAL EVERY 6 HOURS PRN
Status: DISCONTINUED | OUTPATIENT
Start: 2025-01-23 | End: 2025-01-24 | Stop reason: HOSPADM

## 2025-01-23 RX ORDER — ONDANSETRON 4 MG/1
4 TABLET, ORALLY DISINTEGRATING ORAL EVERY 30 MIN PRN
Status: DISCONTINUED | OUTPATIENT
Start: 2025-01-23 | End: 2025-01-23 | Stop reason: HOSPADM

## 2025-01-23 RX ORDER — CEFAZOLIN SODIUM/WATER 2 G/20 ML
2 SYRINGE (ML) INTRAVENOUS SEE ADMIN INSTRUCTIONS
Status: DISCONTINUED | OUTPATIENT
Start: 2025-01-23 | End: 2025-01-23 | Stop reason: HOSPADM

## 2025-01-23 RX ORDER — CALCIUM CARBONATE 500 MG/1
500 TABLET, CHEWABLE ORAL 4 TIMES DAILY PRN
Status: DISCONTINUED | OUTPATIENT
Start: 2025-01-23 | End: 2025-01-24 | Stop reason: HOSPADM

## 2025-01-23 RX ORDER — AMOXICILLIN 250 MG
1 CAPSULE ORAL 2 TIMES DAILY
Qty: 42 TABLET | Refills: 0 | Status: SHIPPED | OUTPATIENT
Start: 2025-01-23

## 2025-01-23 RX ORDER — ASPIRIN 81 MG/1
81 TABLET ORAL 2 TIMES DAILY
Status: DISCONTINUED | OUTPATIENT
Start: 2025-01-23 | End: 2025-01-24 | Stop reason: HOSPADM

## 2025-01-23 RX ORDER — FENTANYL CITRATE 50 UG/ML
100 INJECTION, SOLUTION INTRAMUSCULAR; INTRAVENOUS ONCE
Status: COMPLETED | OUTPATIENT
Start: 2025-01-23 | End: 2025-01-23

## 2025-01-23 RX ORDER — SERTRALINE HYDROCHLORIDE 25 MG/1
25 TABLET, FILM COATED ORAL DAILY
Status: DISCONTINUED | OUTPATIENT
Start: 2025-01-23 | End: 2025-01-24 | Stop reason: HOSPADM

## 2025-01-23 RX ORDER — HYDROMORPHONE HCL IN WATER/PF 6 MG/30 ML
0.1 PATIENT CONTROLLED ANALGESIA SYRINGE INTRAVENOUS EVERY 4 HOURS PRN
Status: DISCONTINUED | OUTPATIENT
Start: 2025-01-23 | End: 2025-01-24 | Stop reason: HOSPADM

## 2025-01-23 RX ORDER — CEFAZOLIN SODIUM 1 G/3ML
1 INJECTION, POWDER, FOR SOLUTION INTRAMUSCULAR; INTRAVENOUS EVERY 8 HOURS
Status: COMPLETED | OUTPATIENT
Start: 2025-01-23 | End: 2025-01-24

## 2025-01-23 RX ORDER — NALOXONE HYDROCHLORIDE 0.4 MG/ML
0.1 INJECTION, SOLUTION INTRAMUSCULAR; INTRAVENOUS; SUBCUTANEOUS
Status: DISCONTINUED | OUTPATIENT
Start: 2025-01-23 | End: 2025-01-23 | Stop reason: HOSPADM

## 2025-01-23 RX ORDER — PHENYLEPHRINE HCL IN 0.9% NACL 50MG/250ML
PLASTIC BAG, INJECTION (ML) INTRAVENOUS CONTINUOUS PRN
Status: DISCONTINUED | OUTPATIENT
Start: 2025-01-23 | End: 2025-01-23

## 2025-01-23 RX ORDER — METHOCARBAMOL 500 MG/1
500 TABLET, FILM COATED ORAL EVERY 6 HOURS PRN
Status: DISCONTINUED | OUTPATIENT
Start: 2025-01-23 | End: 2025-01-24 | Stop reason: HOSPADM

## 2025-01-23 RX ORDER — DONEPEZIL HYDROCHLORIDE 10 MG/1
10 TABLET, FILM COATED ORAL AT BEDTIME
Status: DISCONTINUED | OUTPATIENT
Start: 2025-01-23 | End: 2025-01-24 | Stop reason: HOSPADM

## 2025-01-23 RX ORDER — CEFAZOLIN SODIUM/WATER 2 G/20 ML
2 SYRINGE (ML) INTRAVENOUS
Status: COMPLETED | OUTPATIENT
Start: 2025-01-23 | End: 2025-01-23

## 2025-01-23 RX ORDER — DEXAMETHASONE SODIUM PHOSPHATE 4 MG/ML
4 INJECTION, SOLUTION INTRA-ARTICULAR; INTRALESIONAL; INTRAMUSCULAR; INTRAVENOUS; SOFT TISSUE
Status: DISCONTINUED | OUTPATIENT
Start: 2025-01-23 | End: 2025-01-23 | Stop reason: HOSPADM

## 2025-01-23 RX ORDER — GLYCOPYRROLATE 0.2 MG/ML
INJECTION, SOLUTION INTRAMUSCULAR; INTRAVENOUS PRN
Status: DISCONTINUED | OUTPATIENT
Start: 2025-01-23 | End: 2025-01-23

## 2025-01-23 RX ORDER — AMOXICILLIN 250 MG
1 CAPSULE ORAL 2 TIMES DAILY
Status: DISCONTINUED | OUTPATIENT
Start: 2025-01-23 | End: 2025-01-24 | Stop reason: HOSPADM

## 2025-01-23 RX ORDER — DEXAMETHASONE SODIUM PHOSPHATE 10 MG/ML
INJECTION, SOLUTION INTRAMUSCULAR; INTRAVENOUS PRN
Status: DISCONTINUED | OUTPATIENT
Start: 2025-01-23 | End: 2025-01-23

## 2025-01-23 RX ORDER — SODIUM CHLORIDE 9 MG/ML
INJECTION, SOLUTION INTRAVENOUS CONTINUOUS
Status: DISCONTINUED | OUTPATIENT
Start: 2025-01-23 | End: 2025-01-24 | Stop reason: HOSPADM

## 2025-01-23 RX ORDER — ONDANSETRON 2 MG/ML
4 INJECTION INTRAMUSCULAR; INTRAVENOUS EVERY 30 MIN PRN
Status: DISCONTINUED | OUTPATIENT
Start: 2025-01-23 | End: 2025-01-23

## 2025-01-23 RX ORDER — LIDOCAINE HYDROCHLORIDE 10 MG/ML
INJECTION, SOLUTION INFILTRATION; PERINEURAL PRN
Status: DISCONTINUED | OUTPATIENT
Start: 2025-01-23 | End: 2025-01-23

## 2025-01-23 RX ORDER — FENTANYL CITRATE 50 UG/ML
100 INJECTION, SOLUTION INTRAMUSCULAR; INTRAVENOUS
Status: DISCONTINUED | OUTPATIENT
Start: 2025-01-23 | End: 2025-01-23 | Stop reason: HOSPADM

## 2025-01-23 RX ADMIN — OXYCODONE 5 MG: 5 TABLET ORAL at 11:18

## 2025-01-23 RX ADMIN — FENTANYL CITRATE 25 MCG: 50 INJECTION INTRAMUSCULAR; INTRAVENOUS at 07:30

## 2025-01-23 RX ADMIN — DEXMEDETOMIDINE HYDROCHLORIDE 4 MCG: 100 INJECTION, SOLUTION INTRAVENOUS at 08:26

## 2025-01-23 RX ADMIN — SODIUM CHLORIDE, POTASSIUM CHLORIDE, SODIUM LACTATE AND CALCIUM CHLORIDE: 600; 310; 30; 20 INJECTION, SOLUTION INTRAVENOUS at 06:37

## 2025-01-23 RX ADMIN — HYDROXYZINE HYDROCHLORIDE 10 MG: 10 TABLET, FILM COATED ORAL at 13:22

## 2025-01-23 RX ADMIN — GLYCOPYRROLATE 0.2 MG: 0.2 INJECTION INTRAMUSCULAR; INTRAVENOUS at 07:58

## 2025-01-23 RX ADMIN — PHENYLEPHRINE HYDROCHLORIDE 0.3 MCG/KG/MIN: 10 INJECTION INTRAVENOUS at 07:37

## 2025-01-23 RX ADMIN — ASPIRIN 81 MG: 81 TABLET, COATED ORAL at 12:17

## 2025-01-23 RX ADMIN — ACETAMINOPHEN 975 MG: 325 TABLET ORAL at 20:56

## 2025-01-23 RX ADMIN — Medication 2 G: at 07:16

## 2025-01-23 RX ADMIN — SENNOSIDES AND DOCUSATE SODIUM 1 TABLET: 50; 8.6 TABLET ORAL at 20:58

## 2025-01-23 RX ADMIN — SODIUM CHLORIDE, POTASSIUM CHLORIDE, SODIUM LACTATE AND CALCIUM CHLORIDE: 600; 310; 30; 20 INJECTION, SOLUTION INTRAVENOUS at 08:30

## 2025-01-23 RX ADMIN — SERTRALINE HYDROCHLORIDE 25 MG: 25 TABLET ORAL at 12:17

## 2025-01-23 RX ADMIN — FENTANYL CITRATE 50 MCG: 50 INJECTION INTRAMUSCULAR; INTRAVENOUS at 07:00

## 2025-01-23 RX ADMIN — FENTANYL CITRATE 25 MCG: 50 INJECTION INTRAMUSCULAR; INTRAVENOUS at 07:35

## 2025-01-23 RX ADMIN — SODIUM CHLORIDE, POTASSIUM CHLORIDE, SODIUM LACTATE AND CALCIUM CHLORIDE: 600; 310; 30; 20 INJECTION, SOLUTION INTRAVENOUS at 07:26

## 2025-01-23 RX ADMIN — TRANEXAMIC ACID 1950 MG: 650 TABLET ORAL at 06:07

## 2025-01-23 RX ADMIN — BUPIVACAINE HYDROCHLORIDE 15 ML: 5 INJECTION, SOLUTION EPIDURAL; INTRACAUDAL; PERINEURAL at 07:02

## 2025-01-23 RX ADMIN — ASPIRIN 81 MG: 81 TABLET, COATED ORAL at 20:56

## 2025-01-23 RX ADMIN — SODIUM CHLORIDE: 9 INJECTION, SOLUTION INTRAVENOUS at 12:21

## 2025-01-23 RX ADMIN — ACETAMINOPHEN 975 MG: 325 TABLET ORAL at 12:17

## 2025-01-23 RX ADMIN — DEXAMETHASONE SODIUM PHOSPHATE 10 MG: 10 INJECTION, SOLUTION INTRAMUSCULAR; INTRAVENOUS at 07:45

## 2025-01-23 RX ADMIN — LIDOCAINE HYDROCHLORIDE 30 MG: 10 INJECTION, SOLUTION INFILTRATION; PERINEURAL at 07:33

## 2025-01-23 RX ADMIN — PROPOFOL 20 MG: 10 INJECTION, EMULSION INTRAVENOUS at 07:33

## 2025-01-23 RX ADMIN — CEFAZOLIN 1 G: 1 INJECTION, POWDER, FOR SOLUTION INTRAMUSCULAR; INTRAVENOUS at 15:22

## 2025-01-23 RX ADMIN — SIMVASTATIN 20 MG: 20 TABLET, FILM COATED ORAL at 21:00

## 2025-01-23 RX ADMIN — MEPIVACAINE HYDROCHLORIDE 2.6 ML: 15 INJECTION, SOLUTION EPIDURAL; INFILTRATION at 07:30

## 2025-01-23 RX ADMIN — SENNOSIDES AND DOCUSATE SODIUM 1 TABLET: 50; 8.6 TABLET ORAL at 12:17

## 2025-01-23 RX ADMIN — DEXMEDETOMIDINE HYDROCHLORIDE 8 MCG: 100 INJECTION, SOLUTION INTRAVENOUS at 08:20

## 2025-01-23 RX ADMIN — PROPOFOL 120 MCG/KG/MIN: 10 INJECTION, EMULSION INTRAVENOUS at 07:33

## 2025-01-23 RX ADMIN — ONDANSETRON 4 MG: 2 INJECTION INTRAMUSCULAR; INTRAVENOUS at 07:49

## 2025-01-23 RX ADMIN — CEFAZOLIN 1 G: 1 INJECTION, POWDER, FOR SOLUTION INTRAMUSCULAR; INTRAVENOUS at 23:33

## 2025-01-23 RX ADMIN — DONEPEZIL HYDROCHLORIDE 10 MG: 10 TABLET ORAL at 21:00

## 2025-01-23 ASSESSMENT — ACTIVITIES OF DAILY LIVING (ADL)
ADLS_ACUITY_SCORE: 30
ADLS_ACUITY_SCORE: 41
ADLS_ACUITY_SCORE: 26
ADLS_ACUITY_SCORE: 41
ADLS_ACUITY_SCORE: 28
ADLS_ACUITY_SCORE: 26
ADLS_ACUITY_SCORE: 41
ADLS_ACUITY_SCORE: 30
ADLS_ACUITY_SCORE: 41
ADLS_ACUITY_SCORE: 26
ADLS_ACUITY_SCORE: 26
ADLS_ACUITY_SCORE: 41
ADLS_ACUITY_SCORE: 29

## 2025-01-23 NOTE — ANESTHESIA PREPROCEDURE EVALUATION
Anesthesia Pre-Procedure Evaluation    Patient: Isela Seo   MRN: [id number] : 1945        Procedure : Procedure(s):  RIGHT TOTAL KNEE ARTHROPLASTY          Past Medical History:   Diagnosis Date    Alzheimer dementia (H)     Arthritis     Complication of anesthesia     Delrium after hip surgery    Heart murmur     History of breast cancer     History of lung cancer     History of postoperative delirium     History of skin cancer 2023    Hyperlipidemia     Hypertension     Osteoporosis       Past Surgical History:   Procedure Laterality Date    ABDOMEN SURGERY      adhesion-strangulated bowel    BIOPSY BREAST Right     CATARACT EXTRACTION Bilateral 2011    KNEE SURGERY Left 10/14/2010    Meniscus Repair    LUMPECTOMY BREAST Right     LUNG REMOVAL, PARTIAL      OVARY SURGERY      for infertility    TONSILLECTOMY      ZZC TOTAL HIP ARTHROPLASTY Right 2019    Procedure: RIGHT DIRECT ANTERIOR TOTAL HIP ARTHROPLASTY;  Surgeon: Saad Irving MD;  Location: [facility] OR;  Service: Orthopedics      Allergies   Allergen Reactions    Nkda [No Known Drug Allergy]       Social History     Tobacco Use    Smoking status: Former     Types: Cigarettes    Smokeless tobacco: Never    Tobacco comments:     Quit smoking    Substance Use Topics    Alcohol use: Yes     Alcohol/week: 7.0 - 14.0 standard drinks of alcohol     Types: 7 - 14 Standard drinks or equivalent per week     Comment: 2 bottles beer a day      Wt Readings from Last 1 Encounters:   25 70.3 kg (155 lb)        Anesthesia Evaluation            ROS/MED HX  ENT/Pulmonary:  - neg pulmonary ROS     Neurologic:     (+)   dementia,                             Cardiovascular: Comment: Stress echo from 2018:   The patient's exercise tolerance was normal. No exercise-induced chest pain.    The stress electrocardiogram is negative for inducible ischemic EKG changes.    Left ventricle ejection fraction is normal. The  "estimated left ventricular ejection fraction is 60%. No significant valvular heart abnormalities.    Stress echocardiogram is negative for inducible ischemia.      (+)  hypertension- -   -  - -                                      METS/Exercise Tolerance:     Hematologic:  - neg hematologic  ROS     Musculoskeletal:   (+)  arthritis,             GI/Hepatic:  - neg GI/hepatic ROS     Renal/Genitourinary:  - neg Renal ROS     Endo:  - neg endo ROS     Psychiatric/Substance Use:  - neg psychiatric ROS     Infectious Disease:  - neg infectious disease ROS     Malignancy:       Other:            Physical Exam    Airway  airway exam normal      Mallampati: II   TM distance: > 3 FB   Neck ROM: full   Mouth opening: > 3 cm    Respiratory Devices and Support         Dental       (+) Modest Abnormalities - crowns, retainers, 1 or 2 missing teeth      Cardiovascular   cardiovascular exam normal          Pulmonary   pulmonary exam normal                OUTSIDE LABS:  CBC:   Lab Results   Component Value Date    WBC 6.1 07/12/2019    WBC 6.6 04/22/2010    HGB 9.2 (L) 07/14/2019    HGB 9.2 (L) 07/13/2019    HCT 36.3 07/12/2019    HCT 36.5 04/22/2010     07/12/2019     04/22/2010     BMP:   Lab Results   Component Value Date     (L) 09/13/2021     (L) 07/17/2020    POTASSIUM 4.2 09/13/2021    POTASSIUM 4.5 07/17/2020    CHLORIDE 100 09/13/2021    CHLORIDE 101 07/17/2020    CO2 26 09/13/2021    CO2 24 07/17/2020    BUN 12 09/13/2021    BUN 11 07/17/2020    CR 0.72 09/13/2021    CR 0.71 07/17/2020     09/13/2021     07/17/2020     COAGS: No results found for: \"PTT\", \"INR\", \"FIBR\"  POC: No results found for: \"BGM\", \"HCG\", \"HCGS\"  HEPATIC:   Lab Results   Component Value Date    ALBUMIN 3.9 10/24/2018    PROTTOTAL 6.7 10/24/2018    ALT 15 10/24/2018    AST 21 10/24/2018    ALKPHOS 78 10/24/2018    BILITOTAL 0.9 10/24/2018     OTHER:   Lab Results   Component Value Date    GUERA 9.6 09/13/2021 " "      Anesthesia Plan    ASA Status:  3    NPO Status:  NPO Appropriate    Anesthesia Type: Spinal.              Consents    Anesthesia Plan(s) and associated risks, benefits, and realistic alternatives discussed. Questions answered and patient/representative(s) expressed understanding.     - Discussed:     - Discussed with:  Patient (Daughter)      - Extended Intubation/Ventilatory Support Discussed: No.      - Patient is DNR/DNI Status: No          Postoperative Care    Pain management: Multi-modal analgesia, Peripheral nerve block (Single Shot).   PONV prophylaxis: Ondansetron (or other 5HT-3), Dexamethasone or Solumedrol     Comments:    Other Comments:     Patient has alzheimers and history of delirium after anesthesia. Plan to minimize narcotics and avoid versed.     Preop PNB for post op pain control.  Spinal anesthesia.  Propofol infusion.  Minimal narcotics only as needed.  Decadron, zofran.            Jazmin Ramirez MD    I have reviewed the pertinent notes and labs in the chart from the past 30 days and (re)examined the patient.  Any updates or changes from those notes are reflected in this note.    Clinically Significant Risk Factors Present on Admission                   # Hypertension: Home medication list includes antihypertensive(s)           # Overweight: Estimated body mass index is 25.79 kg/m  as calculated from the following:    Height as of this encounter: 1.651 m (5' 5\").    Weight as of this encounter: 70.3 kg (155 lb).                "

## 2025-01-23 NOTE — PROGRESS NOTES
01/23/25 0575   Appointment Info   Signing Clinician's Name / Credentials (PT) Guille Lance, PT   Quick Adds   Quick Adds Certification   Living Environment   People in Home alone   Current Living Arrangements apartment   Home Accessibility no concerns   Living Environment Comments Daughter and son will be staying with her as needed   Self-Care   Usual Activity Tolerance good   Current Activity Tolerance good   Fall history within last six months no   Activity/Exercise/Self-Care Comment Indep with all ADL's, per patient she still drives and shops.  Does have dementia   General Information   Onset of Illness/Injury or Date of Surgery 01/23/25   Referring Physician Dr. Marx   Patient/Family Therapy Goals Statement (PT) 9 year old old female with a history of HTN, HLP, dementia, lung cancer s/p right lower lobectomy, previous breast cancer, osteoporosis and osteoarthritis underwent right total knee arthroplasty.   Pertinent History of Current Problem (include personal factors and/or comorbidities that impact the POC) R TKA   Existing Precautions/Restrictions no known precautions/restrictions   Weight-Bearing Status - RLE weight-bearing as tolerated   Cognition   Affect/Mental Status (Cognition) WFL   Range of Motion (ROM)   ROM Comment decreased ROM s/p  R TKA   Strength (Manual Muscle Testing)   Strength (Manual Muscle Testing) No deficits observed during functional mobility   Transfers   Transfers sit-stand transfer   Sit-Stand Transfer   Sit-Stand Angie (Transfers) contact guard;verbal cues   Assistive Device (Sit-Stand Transfers) walker, front-wheeled   Gait/Stairs (Locomotion)   Angie Level (Gait) contact guard;verbal cues   Assistive Device (Gait) walker, front-wheeled   Distance in Feet (Gait) 20   Pattern (Gait) step-to   Deviations/Abnormal Patterns (Gait) gait speed decreased;yue decreased   Balance   Balance no deficits were identified   Clinical Impression   Criteria for Skilled  Therapeutic Intervention Yes, treatment indicated   PT Diagnosis (PT) impaired functional mobility   Influenced by the following impairments pain, decreased ROM, impaired balance, decreased strength   Functional limitations due to impairments gait, stairs, transfers   Clinical Presentation (PT Evaluation Complexity) stable   Clinical Presentation Rationale pt presents as medically diagnosed   Clinical Decision Making (Complexity) low complexity   Planned Therapy Interventions (PT) gait training;home exercise program;patient/family education;stair training;transfer training   Risk & Benefits of therapy have been explained care plan/treatment goals reviewed;patient   PT Total Evaluation Time   PT Eval, Low Complexity Minutes (41963) 66   Therapy Certification   Start of care date 01/23/25   Certification date from 01/23/25   Certification date to 01/25/25   Physical Therapy Goals   PT Frequency Daily   PT Predicted Duration/Target Date for Goal Attainment 01/23/25   PT Goals PT Goal 1;Transfers;Gait   PT: Transfers Sit to/from stand;Assistive device;Supervision/stand-by assist   PT: Gait Rolling walker;150 feet;Supervision/stand-by assist   PT: Stairs 4 stairs;Minimal assist;Rail on both sides   PT: Goal 1 Able to perform HEP with supervision for LE strengthening and ROM   Interventions   Interventions Quick Adds Therapeutic Procedure;Therapeutic Activity;Gait Training   Therapeutic Procedure/Exercise   Ther. Procedure: strength, endurance, ROM, flexibillity Minutes (67725) 25   Symptoms Noted During/After Treatment none   Treatment Detail/Skilled Intervention TKA protocol therex x10 reps, cueing for technique, extra time due to patient questions, family arrived halway through and reviewed with them   Therapeutic Activity   Treatment Detail/Skilled Intervention sit to/from stand, cueing for safe hand placement and LE positioning,  struggling to be consistent with remembering   Gait Training   Gait Training Minutes  (74474) 14   Symptoms Noted During/After Treatment (Gait Training) none   Treatment Detail/Skilled Intervention Stable steady gait, vc for reciprocal steps and heel strike,  patient had R knee buckle slightly one time with iniital walking but  did well after that   Distance in Feet 220   [place] Level (Gait Training) contact guard   Physical Assistance Level (Gait Training) verbal cues;supervision;1 person assist   Weight Bearing (Gait Training) weight-bearing as tolerated   Assistive Device (Gait Training) rolling walker   Pattern Analysis (Gait Training) swing-through gait   Gait Analysis Deviations decreased step length;decreased yue;decreased stride length   Impairments (Gait Analysis/Training) pain   PT Discharge Planning   PT Plan progress with gait/transfers, trial of stairs in case of going to family's home, review HEP   PT Discharge Recommendation (DC Rec) other (see comments)  (per ortho MD)   PT Rationale for DC Rec Patient  CGA with gait/transfers and has good home setup and support   PT Brief overview of current status Patient cga with gait/transfers   PT Total Distance Amb During Session (feet) 240   PT Equipment Needed at Discharge cane, straight;walker, rolling   Physical Therapy Time and Intention   Timed Code Treatment Minutes 39   Total Session Time (sum of timed and untimed services) 49   [facility]                                                                                   OUTPATIENT PHYSICAL THERAPY    PLAN OF TREATMENT FOR OUTPATIENT REHABILITATION   Patient's Last Name, First Name, Isela Velez YOB: 1945   Provider's Name   [facility]   Medical Record No.  [id number]     Onset Date: 01/23/25 Start of Care Date: 01/23/25     Medical Diagnosis:                  PT Diagnosis:  impaired functional mobility Certification Dates:  From: 01/23/25  To: 01/25/25       See note for plan of treatment,  functional goals, and certification details.    I CERTIFY THE NEED FOR THESE SERVICES FURNISHED UNDER        THIS PLAN OF TREATMENT AND WHILE UNDER MY CARE (Physician co-signature of this document indicates review and certification of the therapy plan).

## 2025-01-23 NOTE — OP NOTE
Operative Note      PROCEDURE  RIGHT MINIMALLY INVASIVE TOTAL KNEE ARTHROPLASTY     Pre-Procedure Diagnosis:  PRIMARY OSTEOARTHRITIS OF RIGHT KNEE    Post-Procedure Diagnosis:    PRIMARY OSTEOARTHRITIS OF RIGHT KNEE    Surgeon(s):  MD Jb Brown PA-C  A PAC was necessary to ensure safety of this patient and adequate progression of the procedure.    Anesthesia Type:  Spinal    Complications:  None    Condition on discharge from OR:  Stable    Estimated Blood Loss:   50 cc    Specimens:    None       Drains:   None    Implants:  6N RIGHT LEGION CR femur, 4 RIGHT LEONOR II tibia, 11 mm CR HF XLPE insert, and 32 mm patella (Smith [employer] Nephew Legion knee)    Description:  After adequate anesthesia the right knee was prepped and draped in the usual sterile fashion.  We proceeded with an MIS midline incision through the skin and subcutaneous tissue and performed a midvastus approach.  The patella was everted and 10 mm of patellar bone was resected.  The knee was flexed, we identified Whitesides line and drilled the 6 degree intramedullary alignment guide.  We made a cut onto the anterior cortex and then adistal femoral cut of 9 mm.  I then sized the knee at a 6 and made the anterior, posterior, and champfer cuts.  I turned attention to the tibia.  The extramedullary guide was utilized to alignment and the tibial plateau cut was completed without difficulty.  A laminar  was placed and the posterior elements of the medial and lateral menisci and femoral osteophytes were removed.  I then injected the capsule with ropivicaine, morphine, toradol, and epi.  I trialed a 6N femur, a 4 tibia, and an 11 mm CR HF insert.  Alignment and soft tissue balance felt and looked excellent, so we marked and punched the components.  We then used pulsavac lavage to wash the bony surfaces, we dried the bony surfaces and then cemented the tibia, the femur, and the patella in that order.  With the components cemented  in place and all excess cement removed, we let the cement cure.  I put the tourniquet down and gained hemostasis.  Once the cement was cured, I locked the 11 mm insert and irrigated again.  We closed the arthrotomy with #1 ethibond and the VMO fascia with #1 vicryl.  The subQ was closed with 2.0 vicryl and the skin with staples.  Sterile bandages were applied and the patient was returned to the PACU in excellent condition.    Plan:  WBAT   F/U in 2 weeks   Leave Aquacel for 10 days   DVT Prophylaxis: ASA 81 mg PO BID for 40 days

## 2025-01-23 NOTE — PLAN OF CARE
Note from [id number]. Pt rated pain 2-6/10 during shift thus far. No new skin issues noted. ACE is CDI. Full sensation per pt. SBA with walker for transferring. Saline locked. Voiding adequately. Education on medication administration and use of call-light to reduce risk for falls and injury. Alarms in place. No further issues noted. VSS, denies shortness of breath.    Bessie Reyes RN

## 2025-01-23 NOTE — ANESTHESIA POSTPROCEDURE EVALUATION
Patient: Isela Seo    Procedure: Procedure(s):  RIGHT TOTAL KNEE ARTHROPLASTY       Anesthesia Type:  Spinal    Note:  Disposition: Inpatient   Postop Pain Control: Uneventful            Sign Out: Well controlled pain   PONV: No   Neuro/Psych: Uneventful            Sign Out: Acceptable/Baseline neuro status   Airway/Respiratory: Uneventful            Sign Out: Acceptable/Baseline resp. status   CV/Hemodynamics: Uneventful            Sign Out: Acceptable CV status; No obvious hypovolemia; No obvious fluid overload   Other NRE: NONE   DID A NON-ROUTINE EVENT OCCUR?            Last vitals:  Vitals Value Taken Time   /60 01/23/25 1030   Temp 36.3  C (97.34  F) 01/23/25 0935   Pulse 79 01/23/25 1049   Resp 23 01/23/25 0935   SpO2 100 % 01/23/25 1049   Vitals shown include unfiled device data.    Electronically Signed By: Jazmin Ramirez MD  January 23, 2025  10:51 AM

## 2025-01-23 NOTE — ANESTHESIA CARE TRANSFER NOTE
Patient: Isela Seo    Procedure: Procedure(s):  RIGHT TOTAL KNEE ARTHROPLASTY       Diagnosis: Osteoarthritis of right knee [M17.11]  Diagnosis Additional Information: No value filed.    Anesthesia Type:   Spinal     Note:    Oropharynx: oropharynx clear of all foreign objects and spontaneously breathing  Level of Consciousness: awake  Oxygen Supplementation: face mask  Level of Supplemental Oxygen (L/min / FiO2): 8  Independent Airway: airway patency satisfactory and stable  Dentition: dentition unchanged  Vital Signs Stable: post-procedure vital signs reviewed and stable  Report to RN Given: handoff report given  Patient transferred to: PACU    Handoff Report: Identifed the Patient, Identified the Reponsible Provider, Reviewed the pertinent medical history, Discussed the surgical course, Reviewed Intra-OP anesthesia mangement and issues during anesthesia, Set expectations for post-procedure period and Allowed opportunity for questions and acknowledgement of understanding      Vitals:  Vitals Value Taken Time   /56 01/23/25 0908   Temp 36.4  C (97.5  F) 01/23/25 0908   Pulse 88 01/23/25 0908   Resp 30 01/23/25 0908   SpO2 100 % 01/23/25 0908       Electronically Signed By: TANNER Moscoso CRNA  January 23, 2025  9:09 AM

## 2025-01-23 NOTE — PHARMACY-ADMISSION MEDICATION HISTORY
Pharmacist Admission Medication History    Admission medication history is complete. The information provided in this note is only as accurate as the sources available at the time of the update.    Information Source(s): Patient, Family member, and CareEverywhere/SureScripts via in-person    Pertinent Information:     Allergies reviewed with patient and updates made in EHR: no    Medication History Completed By: Raquel Cervantes RPH 1/23/2025 6:42 AM    PTA Med List   Medication Sig Last Dose/Taking    acetaminophen (TYLENOL 8 HOUR ARTHRITIS PAIN) 650 MG CR tablet Take 650 mg by mouth daily. 1/23/2025 Morning    Calcium-Vitamin D (CALCIUM 500/D PO) Take 1 tablet by mouth daily. 1/15/2025 Morning    cyanocobalamin (VITAMIN B-12) 1000 MCG tablet Take 1,000 mcg by mouth daily. 1/15/2025 Morning    donepezil (ARICEPT) 10 MG tablet Take 10 mg by mouth at bedtime. 1/22/2025 Evening    ferrous sulfate (FEROSUL) 325 (65 Fe) MG tablet Take 325 mg by mouth daily (with breakfast). 1/15/2025 Morning    losartan (COZAAR) 50 MG tablet Take 50 mg by mouth daily 1/20/2025 Morning    sertraline (ZOLOFT) 25 MG tablet Take 25 mg by mouth daily 1/20/2025 Morning    simvastatin (ZOCOR) 20 MG tablet Take 20 mg by mouth at bedtime. 1/22/2025 Bedtime

## 2025-01-23 NOTE — ANESTHESIA PROCEDURE NOTES
Adductor canal Procedure Note    Pre-Procedure   Staff -        Anesthesiologist:  Jazmin Ramirez MD       Performed By: anesthesiologist       Location: pre-op       Procedure Start/Stop Times: 1/23/2025 7:02 AM and 1/23/2025 7:04 AM       Pre-Anesthestic Checklist: patient identified, IV checked, site marked, risks and benefits discussed, informed consent, monitors and equipment checked, pre-op evaluation, at physician/surgeon's request and post-op pain management  Timeout:       Correct Patient: Yes        Correct Procedure: Yes        Correct Site: Yes        Correct Position: Yes        Correct Laterality: Yes        Site Marked: Yes  Procedure Documentation  Procedure: Adductor canal         Laterality: right       Patient Position: supine       Patient Prep/Sterile Barriers: sterile gloves, mask       Skin prep: Chloraprep       Needle Gauge: 20.        Needle Length (Inches): 4        Ultrasound guided       1. Ultrasound was used to identify targeted nerve, plexus, vascular marker, or fascial plane and place a needle adjacent to it in real-time.       2. Ultrasound was used to visualize the spread of anesthetic in close proximity to the above referenced structure.       3. A permanent image is entered into the patient's record.       4. The visualized anatomic structures appeared normal.       5. There were no apparent abnormal pathologic findings.    Assessment/Narrative         The placement was negative for: blood aspirated, painful injection and site bleeding       Paresthesias: No.       Bolus given via needle..        Secured via.        Insertion/Infusion Method: Single Shot       Complications: none       Injection made incrementally with aspirations every 5 mL.    Medication(s) Administered   Bupivacaine 0.5% PF (Infiltration) - Infiltration   15 mL - 1/23/2025 7:02:00 AM  Medication Administration Time: 1/23/2025 7:02 AM      FOR [facility] ([facility]) ONLY:   Pain Team Contact information:  "please page the Pain Team Via [facility]. Search \"Pain\". During daytime hours, please page the attending first. At night please page the resident first.      "

## 2025-01-23 NOTE — TREATMENT PLAN
Orthopedic Surgery Pre-Op Plan: Isela Seo  pre-op review. This is NOT an H&P   Surgeon: Dr. Marx    [facility]: [facility]  Name of Surgery: Right Total Knee Arthroplasty   Date of Surgery: 1/23/25  H&P: Completed on 1/15/25 by Dr. Johanna Morton at [facility].   History of ASA, NSAIDS, vitamin and/or herbal supplements, GLP-1 Agonist or SGLT Inhibitor medication taken within 10 days?: No  History of blood thinners?: No    Plan:   1) Discharge Plan: Home on POD 1 in the morning with her daughter helping her and staying with her after surgery. Please see Discharge Planning section near bottom of this note for further details.     2) Alzheimer's dementia without behavioral disturbance: SLUMS 19/30 in Feb 2024. Neuropsych testing in March 2024 indicated neurocognitive disorder likely secondary to Alzheimer's Disease.  On donepezil.  Daughter will be staying with patient at home after surgery.     3) History of Post-Op Delirium: At high risk for developing post-op delirium due to history of post-op delirium with previous hip surgery and Alzheimer's dementia. Recommendations from PCP to help minimize delirium include:  - frequent orientation to time and place  - adequate pain control  - maintaining a consistent sleep schedule  - early immobilization  - addressing any sensory impairments (hearing/vision)  - minimize unnecessary medication  - Family involvement    4) Heart Murmur: murmur noted to be louder on recent preop exam. Patient cleared for knee surgery by PCP after Echocardiogram below on 1/17/25:    Echocardiogram 1/17/25:   Final Conclusion   1.  Normal left ventricular chamber size and systolic function. Estimated left ventricular  ejection fraction is 60-65%. No regional wall   motion abnormalities. Normal left ventricular wall thickness.   2.  Normal right ventricular size and systolic function.   3.  Mild tricuspid valve regurgitation.   4.  Grade 1 left ventricular diastolic  dysfunction consistent with abnormal myocardial  relaxation and normal left ventricular filling   pressure.   5.  Mild LA enlargement.   No previous echo for comparison.   Estimated EF: 60-65%    5) Hyperlipidemia: On simvastatin.    6) Hypertension: Well-controlled on losartan.  Patient was instructed to hold losartan on the morning of surgery.    7) History of Lung Cancer: S/P Right Lower Lobe Lobectomy in 2002: Follows with Pulmonary.     8) History of Breast Cancer: S/P Right Lumpectomy in 2000:    9) Anxiety: On sertraline.    10) Osteoporosis: Calcium and vitamin D.    Patient appears medically optimized for upcoming surgery. I would recommend Hospitalist Consult to assist with medical management. Please call me below with any questions on this patient.       Review of Systems Notable for: Alzheimer's dementia, history of postop delirium, heart murmur, hyperlipidemia, hypertension, history of lung cancer-s/p right lower lobe lobectomy in 2002, history of breast cancer-s/p right lumpectomy in 2000, anxiety, osteoporosis.    Past Medical History:   Past Medical History:   Diagnosis Date    Alzheimer dementia (H)     Arthritis     Complication of anesthesia     Delrium after hip surgery    Heart murmur     History of breast cancer     History of lung cancer     History of postoperative delirium 2019    History of skin cancer 04/2023    Hyperlipidemia     Hypertension     Osteoporosis      Past Surgical History:   Procedure Laterality Date    ABDOMEN SURGERY  1999    adhesion-strangulated bowel    BIOPSY BREAST Right 2000    CATARACT EXTRACTION Bilateral 2011    KNEE SURGERY Left 10/14/2010    Meniscus Repair    LUMPECTOMY BREAST Right 2000    LUNG REMOVAL, PARTIAL  2002    OVARY SURGERY      for infertility    TONSILLECTOMY      ZZC TOTAL HIP ARTHROPLASTY Right 07/12/2019    Procedure: RIGHT DIRECT ANTERIOR TOTAL HIP ARTHROPLASTY;  Surgeon: Saad Irving MD;  Location: [facility];  Service:  Orthopedics       Current Medications:  Patient's Medications   New Prescriptions    No medications on file   Previous Medications    ACETAMINOPHEN (TYLENOL 8 HOUR ARTHRITIS PAIN) 650 MG CR TABLET    Take 650 mg by mouth daily.    CALCIUM-VITAMIN D (CALCIUM 500/D PO)    Take 1 tablet by mouth daily.    CYANOCOBALAMIN (VITAMIN B-12) 1000 MCG TABLET    Take 1,000 mcg by mouth daily.    DONEPEZIL (ARICEPT) 10 MG TABLET    Take 10 mg by mouth at bedtime.    FERROUS SULFATE (FEROSUL) 325 (65 FE) MG TABLET    Take 325 mg by mouth daily (with breakfast).    LOSARTAN (COZAAR) 50 MG TABLET    Take 50 mg by mouth daily    SERTRALINE (ZOLOFT) 25 MG TABLET    Take 25 mg by mouth daily    SIMVASTATIN (ZOCOR) 20 MG TABLET    Take 20 mg by mouth at bedtime.   Modified Medications    No medications on file   Discontinued Medications    ASPIRIN 325 MG TABLET    Take 325 mg by mouth daily. 1/2 Tab Daily    FISH OIL-OMEGA-3 FATTY ACIDS 1000 MG CAPSULE    Take 2 capsules by mouth daily.    MULTIPLE VITAMIN (MULTIVITAMIN) PER TABLET    Take 1 tablet by mouth daily.    SIMVASTATIN (ZOCOR) 10 MG TABLET    Take 1 tablet by mouth daily.       ALLERGIES:  Allergies   Allergen Reactions    Nkda [No Known Drug Allergy]        Social History  Social History     Tobacco Use    Smoking status: Former     Types: Cigarettes    Smokeless tobacco: Never    Tobacco comments:     Quit smoking 1985   Substance Use Topics    Alcohol use: Yes     Alcohol/week: 7.0 - 14.0 standard drinks of alcohol     Types: 7 - 14 Standard drinks or equivalent per week     Comment: 2 bottles beer a day    Drug use: Never       Any Abnormal Recent Diagnostics? Yes  Sodium 134 at preop exam on 1/15/2025: Shows very mild hyponatremia that should not affect proceeding with surgery.     Discharge Planning:     Planning to discharge home on POD 1 in the morning with her daughter helping her and staying with her after surgery.     01/16/25 9515   Discharge Planning    Patient/Family Anticipates Transition to home with family  (outpatient PT arranged at [facility])   Concerns to be Addressed all concerns addressed in this encounter   Living Arrangements   People in Home alone   Type of Residence Private Residence   Is your private residence a single family home or apartment? Apartment   Number of Stairs, Within Home, Primary none  (elevator)   Once home, are you able to live on one level? Yes   Which level? Main Level   Bathroom Shower/Tub Walk-in shower   Equipment Currently Used at Home grab bar, toilet;shower chair;cane, straight  (Has a walker at home)   Support System   Support Systems Children  (daughter, Brittany Seo, will be staying with her for a week after surgery)   Do you have someone available to stay with you one or two nights once you are home? Yes       TANNER Gupta, CNP   Advanced Practice Nurse Navigator- Orthopedics  [facility]   Phone: [phone]

## 2025-01-23 NOTE — CONSULTS
[facility] MEDICINE CONSULT NOTE   Physician requesting consult: Harsha Marx MD    Reason for consult: Postoperative medical management of medical co-morbidities as below    Assessment and Plan    Isela Seo is a 79 year old old female with a history of HTN, HLP, dementia, lung cancer s/p right lower lobectomy, previous breast cancer, osteoporosis and osteoarthritis underwent right total knee arthroplasty. [facility] service was asked to evaluate patient for postoperative medical management as follows below. Please resume the home medications as reconciled and further noted below with ordered hold parameters.  Vital signs have been stable post-operatively including hemodynamically stable blood pressure and heart rate. Thank you for this consult; we will continue to follow this patient until discharge.    Problems:     Dementia: Following with Dr. Walters. Diagnosed with [facility] in 2022. SLUMS in 2022 was 25/30 -> down to 19/30 in Feb of 2024 and had follow-up neuropsych testing in March that diagnosed major neurocognitive disorder secondary to alzheimer's disease. She was started on donepezil in 2024.  Plan: Continue donepezil     HTN: is currently taking losartan 50 mg once a day.   Plan: Hold losartan for now and reevaluate blood pressure in the morning before restarting.  Also ketorolac was ordered scheduled postop but because of history of hypertension we will discontinue that.    Hyponatremia, mild at 133  Plan: Recheck sodium in the morning     Hyperlipidemia: is currently taking simvastatin 40 mg once a day.   Plan: Continue simvastatin     Lung changes: history of lung cancer s/p right lower lobe lobectomy in 2002. She was seen in the [facility] 5/2020 and had a CT scan that showed 2 ground glass opacities in the left upper lung that were a little larger than previous scans. Following with Dr. Sparks. Last CT in 10/2024 with some enlargement suspicious for a slow-growing  adenocarcinoma. Plans f/u in April.    Mood disorder: On sertraline     Advanced Care Planning: requesting to fill out a POLST form today. Just completed an advanced directive and copy given for the chart. Discussion with both Isela and her daughter, Brittany, who is her health care power of . Isela would like to live as long as possible as long as she has good quality of life. She chooses to be DNR/I. She would be ok with non-invasive respiratory support such as CPAP or Bipap if it was felt she may have a reversible condition and could return to her previous level of functioning. Limited trial of a feeding tube again if reversible condition. Ok with IV and PO antibiotics. She does not want to do treatments or procedures that would cause her to linger with a likely terminal condition. Attempted serious illness questions, but was difficult with dementia.    CODE STATUS: DNR but see above for details and fine with respiratory support with BiPAP or CPAP if needed    Postop right total knee arthroplasty  Currently doing well  Plan: Per orthopedic surgical team    DVT prophylaxis: Aspirin 81 mg twice daily per orthopedic surgical team    Estimated Blood Loss:  50 mL      -Reviewed the patient's preoperative H and P and updated missing elements.  -Home medication reconciliation has been reviewed.  Medications have been ordered as noted from the home list and changes are documented above     HISTORY     Isela Seo is a 79 year old old female who lives in an apartment with support.  Onset of dementia in 2022 which is gradually worsened.  Now on donepezil.  Chronic medical problems are stable.  Patient is seen postop and reports she is currently doing well.  Right knee pain is 5/10.  She denies any nausea.  No chest pain or shortness of breath or lightheadedness.  No other concerns or issues.    Past Medical History     Patient Active Problem List    Diagnosis Date Noted    Primary osteoarthritis of right knee  01/23/2025     Priority: Medium    Hip osteoarthritis 07/12/2019     Priority: Medium        Surgical History   She  has a past surgical history that includes Biopsy breast (Right, 2000); Lumpectomy breast (Right, 2000); Tonsillectomy; Lung Removal, Partial (2002); knee surgery (Left, 10/14/2010); Abdomen surgery (1999); TOTAL HIP ARTHROPLASTY (Right, 07/12/2019); Ovary surgery; and Cataract Extraction (Bilateral, 2011).     Past Surgical History:   Procedure Laterality Date    ABDOMEN SURGERY  1999    adhesion-strangulated bowel    BIOPSY BREAST Right 2000    CATARACT EXTRACTION Bilateral 2011    KNEE SURGERY Left 10/14/2010    Meniscus Repair    LUMPECTOMY BREAST Right 2000    LUNG REMOVAL, PARTIAL  2002    OVARY SURGERY      for infertility    TONSILLECTOMY      ZZC TOTAL HIP ARTHROPLASTY Right 07/12/2019    Procedure: RIGHT DIRECT ANTERIOR TOTAL HIP ARTHROPLASTY;  Surgeon: Saad Irving MD;  Location: [facility];  Service: Orthopedics       Family History    Reviewed, and family history includes Breast Cancer in an other family member; Breast Cancer (age of onset: 90.00) in her maternal grandmother.    Social History    Reviewed, and she  reports that she has quit smoking. Her smoking use included cigarettes. She has never used smokeless tobacco. She reports current alcohol use of about 7.0 - 14.0 standard drinks of alcohol per week. She reports that she does not use drugs.  Social History     Tobacco Use    Smoking status: Former     Types: Cigarettes    Smokeless tobacco: Never    Tobacco comments:     Quit smoking 1985   Substance Use Topics    Alcohol use: Yes     Alcohol/week: 7.0 - 14.0 standard drinks of alcohol     Types: 7 - 14 Standard drinks or equivalent per week     Comment: 2 bottles beer a day     Allergies     Allergies   Allergen Reactions    Nkda [No Known Drug Allergy]        Prior to Admission Medications      Medications Prior to Admission   Medication Sig Dispense Refill Last  Dose/Taking    acetaminophen (TYLENOL 8 HOUR ARTHRITIS PAIN) 650 MG CR tablet Take 650 mg by mouth daily.   1/23/2025 Morning    Calcium-Vitamin D (CALCIUM 500/D PO) Take 1 tablet by mouth daily.   1/15/2025 Morning    cyanocobalamin (VITAMIN B-12) 1000 MCG tablet Take 1,000 mcg by mouth daily.   1/15/2025 Morning    donepezil (ARICEPT) 10 MG tablet Take 10 mg by mouth at bedtime.   1/22/2025 Evening    ferrous sulfate (FEROSUL) 325 (65 Fe) MG tablet Take 325 mg by mouth daily (with breakfast).   1/15/2025 Morning    losartan (COZAAR) 50 MG tablet Take 50 mg by mouth daily   1/20/2025 Morning    sertraline (ZOLOFT) 25 MG tablet Take 25 mg by mouth daily   1/20/2025 Morning    simvastatin (ZOCOR) 20 MG tablet Take 20 mg by mouth at bedtime.   1/22/2025 Bedtime       Review of Systems   A 12 point comprehensive review of systems was negative except as noted above.    OBJECTIVE         Physical Exam   Temp:  [97.3  F (36.3  C)-98.1  F (36.7  C)] 97.3  F (36.3  C)  Pulse:  [64-88] 81  Resp:  [16-30] 30  BP: (121-159)/(56-88) 130/67  SpO2:  [97 %-100 %] 99 %  Body mass index is 25.79 kg/m .  Patient is alert and appears to be in no apparent distress  HEENT is unremarkable with good oral hygiene  Neck: No thyromegaly or adenopathy  Lungs are clear throughout  Cardiovascular exam shows regular rate and rhythm with grade 3/6 systolic murmur heard loudest at the right upper sternal margin.  This is reported in prior documentation  Abdomen soft and nontender  No CVA tenderness  Extremities show no ankle edema  Neurologic: Cranial nerves II through XII intact except for subtle droop of the left corner of her mouth but with smile she is able to raise the corner of her mouth.  Full extraocular movements.  Good  strength bilaterally.  Normal dorsiflexion and plantarflexion.  Oriented x 2 and appears to have some mild cognitive deficit.  Gave me the correct year but thought it was February.  She gave me the name of the  "hospital quickly.      Imaging Reviewed Personally By Myself    Radiology Results:   Recent Results (from the past 24 hours)   POC US Guidance Needle Placement    Narrative    Ultrasound was performed as guidance to an anesthesia procedure.  Click   \"PACS images\" hyperlink below to view any stored images.  For specific   procedure details, view procedure note authored by anesthesia.       Labs Reviewed Personally By Myself   Most Recent 3 CBC's:  Recent Labs   Lab Test 07/14/19  0439 07/13/19  0416 07/12/19  0836   WBC  --   --  6.1   HGB 9.2* 9.2* 12.5   MCV  --   --  83   PLT  --   --  246     Most Recent 3 BMP's:  Recent Labs   Lab Test 01/23/25  1145 09/13/21  1614 07/17/20  1041   * 135* 135*   POTASSIUM 4.2 4.2 4.5   CHLORIDE 98 100 101   CO2 25 26 24   BUN 8.8 12 11   CR 0.56 0.72 0.71   ANIONGAP 10 9 10   GUERA 9.1 9.6 9.3   * 103 114     Most Recent 2 LFT's:  Recent Labs   Lab Test 10/24/18  0835   AST 21   ALT 15   ALKPHOS 78   BILITOTAL 0.9       Preoperative Labs Reviewed Personally By Myself     Thank you for this consultation.  Appreciate the opportunity to participate in the care of Isela Seo, please feel free to contact us for any questions or concerns.    Elia Guy MD  [facility]  Phone: #[phone]   "

## 2025-01-23 NOTE — ANESTHESIA PROCEDURE NOTES
"Intrathecal injection Procedure Note    Pre-Procedure   Staff -        Anesthesiologist:  Jazmin Ramirez MD       Performed By: anesthesiologist       Location: OR       Procedure Start/Stop Times: 1/23/2025 7:30 AM and 1/23/2025 7:32 AM       Pre-Anesthestic Checklist: patient identified, IV checked, risks and benefits discussed, informed consent, monitors and equipment checked, pre-op evaluation and at physician/surgeon's request  Timeout:       Correct Patient: Yes        Correct Procedure: Yes        Correct Site: Yes        Correct Position: Yes   Procedure Documentation  Procedure: intrathecal injection         Patient Position: sitting       Skin prep: Chloraprep       Insertion Site: L3-4. (midline approach).       Needle Gauge: 24.        Needle Length (Inches): 4        Spinal Needle Type: Pencan       Introducer used       # of attempts: 1 and  # of redirects:     Assessment/Narrative         Paresthesias: No.       CSF fluid: clear.    Medication(s) Administered   1.5% Mepivacaine PF (Intrathecal) - Intrathecal   2.6 mL - 1/23/2025 7:30:00 AM  Medication Administration Time: 1/23/2025 7:30 AM      FOR [facility] ([facility]) ONLY:   Pain Team Contact information: please page the Pain Team Via [employer]. Search \"Pain\". During daytime hours, please page the attending first. At night please page the resident first.      "

## 2025-01-24 ENCOUNTER — APPOINTMENT (OUTPATIENT)
Dept: OCCUPATIONAL THERAPY | Facility: CLINIC | Age: 80
End: 2025-01-24
Attending: ORTHOPAEDIC SURGERY
Payer: COMMERCIAL

## 2025-01-24 ENCOUNTER — APPOINTMENT (OUTPATIENT)
Dept: PHYSICAL THERAPY | Facility: CLINIC | Age: 80
End: 2025-01-24
Attending: ORTHOPAEDIC SURGERY
Payer: COMMERCIAL

## 2025-01-24 VITALS
HEART RATE: 69 BPM | DIASTOLIC BLOOD PRESSURE: 67 MMHG | HEIGHT: 65 IN | TEMPERATURE: 99 F | OXYGEN SATURATION: 94 % | SYSTOLIC BLOOD PRESSURE: 152 MMHG | BODY MASS INDEX: 25.83 KG/M2 | RESPIRATION RATE: 16 BRPM | WEIGHT: 155 LBS

## 2025-01-24 LAB
ANION GAP SERPL CALCULATED.3IONS-SCNC: 7 MMOL/L (ref 7–15)
BUN SERPL-MCNC: 11.6 MG/DL (ref 8–23)
CALCIUM SERPL-MCNC: 9.1 MG/DL (ref 8.8–10.4)
CHLORIDE SERPL-SCNC: 105 MMOL/L (ref 98–107)
CREAT SERPL-MCNC: 0.62 MG/DL (ref 0.51–0.95)
EGFRCR SERPLBLD CKD-EPI 2021: 90 ML/MIN/1.73M2
GLUCOSE SERPL-MCNC: 101 MG/DL (ref 70–99)
HCO3 SERPL-SCNC: 26 MMOL/L (ref 22–29)
HGB BLD-MCNC: 9 G/DL (ref 11.7–15.7)
MAGNESIUM SERPL-MCNC: 2.1 MG/DL (ref 1.7–2.3)
POTASSIUM SERPL-SCNC: 4.3 MMOL/L (ref 3.4–5.3)
SODIUM SERPL-SCNC: 138 MMOL/L (ref 135–145)

## 2025-01-24 PROCEDURE — 80048 BASIC METABOLIC PNL TOTAL CA: CPT | Performed by: FAMILY MEDICINE

## 2025-01-24 PROCEDURE — 97116 GAIT TRAINING THERAPY: CPT | Mod: GP,XU

## 2025-01-24 PROCEDURE — 85018 HEMOGLOBIN: CPT | Performed by: ORTHOPAEDIC SURGERY

## 2025-01-24 PROCEDURE — 36415 COLL VENOUS BLD VENIPUNCTURE: CPT | Performed by: ORTHOPAEDIC SURGERY

## 2025-01-24 PROCEDURE — 250N000013 HC RX MED GY IP 250 OP 250 PS 637: Performed by: ORTHOPAEDIC SURGERY

## 2025-01-24 PROCEDURE — 97166 OT EVAL MOD COMPLEX 45 MIN: CPT | Mod: GO

## 2025-01-24 PROCEDURE — 97150 GROUP THERAPEUTIC PROCEDURES: CPT | Mod: GP

## 2025-01-24 PROCEDURE — 250N000013 HC RX MED GY IP 250 OP 250 PS 637: Performed by: FAMILY MEDICINE

## 2025-01-24 PROCEDURE — 99232 SBSQ HOSP IP/OBS MODERATE 35: CPT | Performed by: FAMILY MEDICINE

## 2025-01-24 PROCEDURE — 83735 ASSAY OF MAGNESIUM: CPT | Performed by: FAMILY MEDICINE

## 2025-01-24 PROCEDURE — 97535 SELF CARE MNGMENT TRAINING: CPT | Mod: GO,XU

## 2025-01-24 RX ORDER — OXYCODONE HYDROCHLORIDE 5 MG/1
2.5-5 TABLET ORAL EVERY 4 HOURS PRN
Qty: 25 TABLET | Refills: 0 | Status: SHIPPED | OUTPATIENT
Start: 2025-01-24

## 2025-01-24 RX ADMIN — POLYETHYLENE GLYCOL 3350 17 G: 17 POWDER, FOR SOLUTION ORAL at 08:16

## 2025-01-24 RX ADMIN — OXYCODONE 5 MG: 5 TABLET ORAL at 08:22

## 2025-01-24 RX ADMIN — HYDROXYZINE HYDROCHLORIDE 10 MG: 10 TABLET, FILM COATED ORAL at 04:03

## 2025-01-24 RX ADMIN — ASPIRIN 81 MG: 81 TABLET, COATED ORAL at 08:16

## 2025-01-24 RX ADMIN — METHOCARBAMOL 500 MG: 500 TABLET ORAL at 10:10

## 2025-01-24 RX ADMIN — SENNOSIDES AND DOCUSATE SODIUM 1 TABLET: 50; 8.6 TABLET ORAL at 08:16

## 2025-01-24 RX ADMIN — SERTRALINE HYDROCHLORIDE 25 MG: 25 TABLET ORAL at 08:16

## 2025-01-24 RX ADMIN — ACETAMINOPHEN 975 MG: 325 TABLET ORAL at 04:03

## 2025-01-24 RX ADMIN — OXYCODONE 5 MG: 5 TABLET ORAL at 04:03

## 2025-01-24 ASSESSMENT — ACTIVITIES OF DAILY LIVING (ADL)
ADLS_ACUITY_SCORE: 41
IADL_COMMENTS: FAMILY WILL DO UNTIL PT IS RECOVERED
ADLS_ACUITY_SCORE: 41

## 2025-01-24 NOTE — DISCHARGE SUMMARY
[facility] Orthopedics Discharge Summary                                  [facility]     OMARI SMYTH [id number]   Age: 79 year old  PCP: No Ref-Primary, Physician, None 1945     Date of Admission:  1/23/2025  Date of Discharge::  1/24/2025  Discharge Provider:  TANNER Ortega CNP    Code status:  Prior    Admission Information:  Admission Diagnosis:  Osteoarthritis of right knee [M17.11]    Post-Operative Day: 1 Day Post-Op     Reason for admission:  The patient was admitted for the following:Procedure(s) (LRB):  RIGHT TOTAL KNEE ARTHROPLASTY (Right)    Active Problems:    Primary osteoarthritis of right knee      Allergies:  Nkda [no known drug allergy]    Following the procedure noted above the patient was transferred to the post-op floor and started on:    Therapy:  physical therapy and occupational therapy  Anticoagulation Plan: Aspirin 81 mg BID  for 40 days  Pain Management: scopainmedication: oxycodone, tylenol, and Robaxin  Weight bearing status: Weight bearing as tolerated     The patient was followed by Orthopedics during the inpatient treatment course:  Complications:  None  Additional consultations:  Hospitalist      Pertinent Labs   Lab Results: personally reviewed.     Recent Labs   Lab Test 01/24/25  0647 01/23/25  1145 09/13/21  1614 07/17/20  1041 07/14/19  0439 07/13/19  0416 07/12/19  0836   WBC  --   --   --   --   --   --  6.1   HGB 9.0*  --   --   --  9.2* 9.2* 12.5   HCT  --   --   --   --   --   --  36.3   MCV  --   --   --   --   --   --  83   PLT  --   --   --   --   --   --  246    133* 135*   < >  --   --   --     < > = values in this interval not displayed.          Discharge Information:  Condition at discharge: Stable  Discharge destination:  Discharged to home     Medications at discharge:  Discharge Medication List as of 1/24/2025 11:26 AM        START taking these medications    Details   aspirin 81 MG EC tablet Take 1 tablet (81 mg) by mouth 2  times daily., Disp-80 tablet, R-0, E-Prescribe      oxyCODONE (ROXICODONE) 5 MG tablet Take 0.5-1 tablets (2.5-5 mg) by mouth every 4 hours as needed for pain., Disp-25 tablet, R-0, E-Prescribe      senna-docusate (SENOKOT-S/PERICOLACE) 8.6-50 MG tablet Take 1 tablet by mouth 2 times daily., Disp-42 tablet, R-0, E-Prescribe           CONTINUE these medications which have NOT CHANGED    Details   acetaminophen (TYLENOL 8 HOUR ARTHRITIS PAIN) 650 MG CR tablet Take 650 mg by mouth daily., Historical      Calcium-Vitamin D (CALCIUM 500/D PO) Take 1 tablet by mouth daily., Historical      cyanocobalamin (VITAMIN B-12) 1000 MCG tablet Take 1,000 mcg by mouth daily., Historical      donepezil (ARICEPT) 10 MG tablet Take 10 mg by mouth at bedtime., Historical      ferrous sulfate (FEROSUL) 325 (65 Fe) MG tablet Take 325 mg by mouth daily (with breakfast)., Historical      losartan (COZAAR) 50 MG tablet Take 50 mg by mouth daily, Historical      sertraline (ZOLOFT) 25 MG tablet Take 25 mg by mouth daily, Historical      simvastatin (ZOCOR) 20 MG tablet Take 20 mg by mouth at bedtime., Historical                        Follow-Up Care:  Patient should be seen in the office in 14 days by the Orthopedic Surgeon/Physician Assistant.  Call [phone] for appointment or questions.    It was my pleasure to take care of the above patient.  TANNER Ortega CNP

## 2025-01-24 NOTE — PROGRESS NOTES
01/24/25 0915   Appointment Info   Signing Clinician's Name / Credentials (OT) Anahy Guerrier, OTR/L   Quick Adds   Quick Adds Certification   Living Environment   People in Home alone   Current Living Arrangements apartment  (daughter will stay with her as long as needed, son will help too)   Living Environment Comments has standard toilet with toilet rails, walk in shower with chair   Self-Care   Usual Activity Tolerance good   Current Activity Tolerance moderate   Activity/Exercise/Self-Care Comment Pt was independent with ADLS and IADLS prior to admit   Instrumental Activities of Daily Living (IADL)   IADL Comments Family will do until pt is recovered   General Information   Onset of Illness/Injury or Date of Surgery 01/23/25   Referring Physician Harsha Marx   Patient/Family Therapy Goal Statement (OT) heal well and get back to walking everyday   Additional Occupational Profile Info/Pertinent History of Current Problem Isela Seo is a 79 year old old female with a history of HTN, HLP, dementia, lung cancer s/p right lower lobectomy, previous breast cancer, osteoporosis and osteoarthritis underwent right total knee arthroplasty   Existing Precautions/Restrictions fall;no pivoting or twisting   Cognitive Status Examination   Affect/Mental Status (Cognitive) WFL   Memory Deficit moderate deficit   Cognitive Status Comments pt demonstrated memory loss all throughout the session. Daughter present and she took notes to remember techniques so she can cue her mother at home.   Range of Motion Comprehensive   General Range of Motion no range of motion deficits identified   Strength Comprehensive (MMT)   General Manual Muscle Testing (MMT) Assessment no strength deficits identified   Bed Mobility   Comment (Bed Mobility) mod   Transfers   Transfer Comments min   Activities of Daily Living   BADL Assessment/Intervention lower body dressing;toileting   Lower Body Dressing Assessment/Training   [place] Level  (Lower Body Dressing) moderate assist (50% patient effort)   Toileting   [place] Level (Toileting) moderate assist (50% patient effort)   Clinical Impression   Criteria for Skilled Therapeutic Interventions Met (OT) Yes, treatment indicated   OT Diagnosis Decreased independence with adls   OT Problem List-Impairments impacting ADL post-surgical precautions;pain;mobility   Assessment of Occupational Performance 3-5 Performance Deficits   Identified Performance Deficits dressing, transfers, bed mobility, toileting   Planned Therapy Interventions (OT) ADL retraining;transfer training;bed mobility training   Clinical Decision Making Complexity (OT) detailed assessment/moderate complexity   Risk & Benefits of therapy have been explained evaluation/treatment results reviewed;care plan/treatment goals reviewed;risks/benefits reviewed;current/potential barriers reviewed;participants voiced agreement with care plan;participants included;patient   OT Total Evaluation Time   OT Eval, Moderate Complexity Minutes (69379) 15   Therapy Certification   Medical Diagnosis TKA   Start of Care Date 01/24/25   Certification date from 01/24/25   Certification date to 01/24/25   OT Goals   Therapy Frequency (OT) One time eval and treatment   OT Goals Lower Body Dressing;Bed Mobility;Toilet Transfer/Toileting   OT: Lower Body Dressing Supervision/stand-by assist;within precautions;Goal Met;Completed   OT: Bed Mobility Supervision/stand-by assist;supine to/from sitting;rolling;within precautions;Goal Met;Completed   OT: Toilet Transfer/Toileting Supervision/stand-by assist;toilet transfer;cleaning and garment management;within precautions;Goal Met;Completed   Interventions   Interventions Quick Adds Self-Care/Home Management   Self-Care/Home Management   Self-Care/Home Mgmt/ADL, Compensatory, Meal Prep Minutes (14850) 35   Symptoms Noted During/After Treatment (Meal Preparation/Planning Training) increased pain   Treatment  Detail/Skilled Intervention Taught total body dressing with pt after total joint replacement surgery, while following TKA precautions.  Taught pt how to perform transfers safely to chair, bed, toilet, and car. Taught pt how to get in and out of bed and position leg in bed for comfort and safety. Taught pt walker safety skills. Answered all questions.  Pt SBA with all after OT intervention. Daughter present and supportive.  She will cue her mom as needed for safety at home.   OT Discharge Planning   OT Plan DC OT   OT Discharge Recommendation (DC Rec) other (see comments)  (defer to ortho)   OT Rationale for DC Rec Pt has good support at home   OT Brief overview of current status Pt is SBA with basic adls and functional mobility after OT intervention. Her daughter will assist her as needed.   OT Total Distance Amb During Session (feet) 40   Total Session Time   Timed Code Treatment Minutes 35   Total Session Time (sum of timed and untimed services) 50     [facility]                                                                                   OUTPATIENT OCCUPATIONAL THERAPY    PLAN OF TREATMENT FOR OUTPATIENT REHABILITATION   Patient's Last Name, First Name, MIsela Watt YOB: 1945   Provider's Name   [facility]   Medical Record No.  [id number]     Onset Date: 01/23/25 Start of Care Date: 01/24/25     Medical Diagnosis:  TKA               OT Diagnosis:  Decreased independence with adls Certification Dates:  From: 01/24/25  To: 01/24/25     See note for plan of treatment, functional goals, and certification details.    I CERTIFY THE NEED FOR THESE SERVICES FURNISHED UNDER        THIS PLAN OF TREATMENT AND WHILE UNDER MY CARE (Physician co-signature of this document indicates review and certification of the therapy plan).

## 2025-01-24 NOTE — PROGRESS NOTES
Physical Therapy Discharge Summary    Reason for therapy discharge:    All goals and outcomes met, no further needs identified.    Progress towards therapy goal(s). See goals on Care Plan in [facility] electronic health record for goal details.  Goals met    Therapy recommendation(s):    Continue home exercise program.

## 2025-01-24 NOTE — PROGRESS NOTES
01/24/25 0915   Appointment Info   Signing Clinician's Name / Credentials (OT) Anahy Guerrier, OTR/L   Quick Adds   Quick Adds Certification   Living Environment   People in Home alone   Current Living Arrangements apartment  (daughter will stay with her as long as needed, son will help too)   Living Environment Comments has standard toilet with toilet rails, walk in shower with chair   Self-Care   Usual Activity Tolerance good   Current Activity Tolerance moderate   Activity/Exercise/Self-Care Comment Pt was independent with ADLS and IADLS prior to admit   Instrumental Activities of Daily Living (IADL)   IADL Comments Family will do until pt is recovered   General Information   Onset of Illness/Injury or Date of Surgery 01/23/25   Referring Physician Harsha Marx   Patient/Family Therapy Goal Statement (OT) heal well and get back to walking everyday   Additional Occupational Profile Info/Pertinent History of Current Problem Isela Seo is a 79 year old old female with a history of HTN, HLP, dementia, lung cancer s/p right lower lobectomy, previous breast cancer, osteoporosis and osteoarthritis underwent right total knee arthroplasty   Existing Precautions/Restrictions fall;no pivoting or twisting   Cognitive Status Examination   Affect/Mental Status (Cognitive) WFL   Memory Deficit moderate deficit   Cognitive Status Comments pt demonstrated memory loss all throughout the session. Daughter present and she took notes to remember techniques so she can cue her mother at home.   Range of Motion Comprehensive   General Range of Motion no range of motion deficits identified   Strength Comprehensive (MMT)   General Manual Muscle Testing (MMT) Assessment no strength deficits identified   Bed Mobility   Comment (Bed Mobility) mod   Transfers   Transfer Comments min   Activities of Daily Living   BADL Assessment/Intervention lower body dressing;toileting   Lower Body Dressing Assessment/Training   [place] Level  (Lower Body Dressing) moderate assist (50% patient effort)   Toileting   [place] Level (Toileting) moderate assist (50% patient effort)   Clinical Impression   Criteria for Skilled Therapeutic Interventions Met (OT) Yes, treatment indicated   OT Diagnosis Decreased independence with adls   OT Problem List-Impairments impacting ADL post-surgical precautions;pain;mobility   Assessment of Occupational Performance 3-5 Performance Deficits   Identified Performance Deficits dressing, transfers, bed mobility, toileting   Planned Therapy Interventions (OT) ADL retraining;transfer training;bed mobility training   Clinical Decision Making Complexity (OT) detailed assessment/moderate complexity   Risk & Benefits of therapy have been explained evaluation/treatment results reviewed;care plan/treatment goals reviewed;risks/benefits reviewed;current/potential barriers reviewed;participants voiced agreement with care plan;participants included;patient   OT Total Evaluation Time   OT Eval, Moderate Complexity Minutes (99747) 15   Therapy Certification   Medical Diagnosis TKA   Start of Care Date 01/24/25   Certification date from 01/24/25   Certification date to 01/24/25   OT Goals   Therapy Frequency (OT) One time eval and treatment   OT Goals Lower Body Dressing;Bed Mobility;Toilet Transfer/Toileting   OT: Lower Body Dressing Supervision/stand-by assist;within precautions;Goal Met;Completed   OT: Bed Mobility Supervision/stand-by assist;supine to/from sitting;rolling;within precautions;Goal Met;Completed   OT: Toilet Transfer/Toileting Supervision/stand-by assist;toilet transfer;cleaning and garment management;within precautions;Goal Met;Completed   Interventions   Interventions Quick Adds Self-Care/Home Management   Self-Care/Home Management   Self-Care/Home Mgmt/ADL, Compensatory, Meal Prep Minutes (64607) 35   Symptoms Noted During/After Treatment (Meal Preparation/Planning Training) increased pain   Treatment  Detail/Skilled Intervention Taught total body dressing with pt after total joint replacement surgery, while following TKA precautions.  Taught pt how to perform transfers safely to chair, bed, toilet, and car. Taught pt how to get in and out of bed and position leg in bed for comfort and safety. Taught pt walker safety skills. Answered all questions.  Pt SBA with all after OT intervention. Daughter present and supportive.  She will cue her mom as needed for safety at home.   OT Discharge Planning   OT Plan DC OT   OT Discharge Recommendation (DC Rec) other (see comments)  (defer to ortho)   OT Rationale for DC Rec Pt has good support at home   OT Brief overview of current status Pt is SBA with basic adls and functional mobility after OT intervention. Her daughter will assist her as needed.   OT Total Distance Amb During Session (feet) 40   Total Session Time   Timed Code Treatment Minutes 35   Total Session Time (sum of timed and untimed services) 50

## 2025-01-24 NOTE — PROGRESS NOTES
[facility] MEDICINE PROGRESS NOTE      Identification/Summary: Isela Seo is a 79 year old old female with a history of HTN, HLP, dementia, lung cancer s/p right lower lobectomy, previous breast cancer, osteoporosis and osteoarthritis underwent right total knee arthroplasty on 1/23/2025. Hospital course is unremarkable.  Did well with therapy and orthopedic surgical team discharged her home today.       Assessment and Plan:     Dementia: Following with Dr. Walters. Diagnosed with MCI in 2022. SLUMS in 2022 was 25/30 -> down to 19/30 in Feb of 2024 and had follow-up neuropsych testing in March that diagnosed major neurocognitive disorder secondary to alzheimer's disease. She was started on donepezil in 2024.  Plan: Continue donepezil     HTN: is currently taking losartan 50 mg once a day.     Hyponatremia, mild at 133 postop but normalized to 138 this morning     Hyperlipidemia: is currently taking simvastatin 40 mg once a day.      Lung changes: history of lung cancer s/p right lower lobe lobectomy in 2002. She was seen in [facility] 5/2020 and had a CT scan that showed 2 ground glass opacities in the left upper lung that were a little larger than previous scans. Following with Dr. Sparks. Last CT in 10/2024 with some enlargement suspicious for a slow-growing adenocarcinoma. Plans f/u in April.     Mood disorder: On sertraline     Advanced Care Planning: requesting to fill out a POLST form today. Just completed an advanced directive and copy given for the chart. Discussion with both Isela and her daughter, Brittany, who is her health care power of . Isela would like to live as long as possible as long as she has good quality of life. She chooses to be DNR/I. She would be ok with non-invasive respiratory support such as CPAP or Bipap if it was felt she may have a reversible condition and could return to her previous level of functioning. Limited trial of a feeding tube  again if reversible condition. Ok with IV and PO antibiotics. She does not want to do treatments or procedures that would cause her to linger with a likely terminal condition. Attempted serious illness questions, but was difficult with dementia.     CODE STATUS: DNR but see above for details and fine with respiratory support with BiPAP or CPAP if needed     Postop day #1, right total knee arthroplasty  Currently doing well  Plan: Per orthopedic surgical team Home today     DVT prophylaxis: Aspirin 81 mg twice daily per orthopedic surgical team    The patient's care was discussed with the Bedside Nurse and Patient.    Elia Guy MD  [facility]  Phone: #[phone]  Securely message with the Vocera Web Console (learn more here)  Text page via "[employer] Paging/[employer]     Interval History/Subjective:  Patient complains of right knee pain 6/10.  States she has no other concerns.  Eating well.  No nausea.  No chest pain or shortness of breath or lightheadedness.    Physical Exam/Objective:  Temp:  [97.6  F (36.4  C)-99  F (37.2  C)] 99  F (37.2  C)  Pulse:  [69-92] 69  Resp:  [16-20] 16  BP: (123-152)/(59-82) 152/67  SpO2:  [93 %-98 %] 94 %  Body mass index is 25.79 kg/m .    GENERAL:  Alert, appears comfortable, in no acute distress, appears stated age   HEAD:  Normocephalic, without obvious abnormality, atraumatic   LUNGS:   Clear to auscultation bilaterally, no rales, rhonchi, or wheezing, symmetric chest rise on inhalation, respirations unlabored   CHEST WALL:  No deformity   HEART:  Regular rate and rhythm, no murmur   ABDOMEN:   Soft, non-tender   EXTREMITIES: No ankle edema    SKIN: Dry to touch, no exanthems in the visualized areas   NEURO: Alert, appears to have some mild to moderate cognitive impairment.  Could give me the name of the hospital and the year but thought it was February, moves all four extremities freely   PSYCH: Cooperative,  behavior is appropriate      Data reviewed today: I personally reviewed all new medications, labs, imaging/diagnostics reports over the past 24 hours. Pertinent findings include:    Imaging:   No results found for this or any previous visit (from the past 24 hours).    Labs:  Most Recent 3 CBC's:  Recent Labs   Lab Test 01/24/25  0647 07/14/19  0439 07/13/19  0416 07/12/19  0836   WBC  --   --   --  6.1   HGB 9.0* 9.2* 9.2* 12.5   MCV  --   --   --  83   PLT  --   --   --  246     Most Recent 3 BMP's:  Recent Labs   Lab Test 01/24/25  0647 01/23/25  1145 09/13/21  1614    133* 135*   POTASSIUM 4.3 4.2 4.2   CHLORIDE 105 98 100   CO2 26 25 26   BUN 11.6 8.8 12   CR 0.62 0.56 0.72   ANIONGAP 7 10 9   GUERA 9.1 9.1 9.6   * 143* 103     Most Recent 2 LFT's:  Recent Labs   Lab Test 10/24/18  0835   AST 21   ALT 15   ALKPHOS 78   BILITOTAL 0.9       Medications:   Personally Reviewed.  Medications   Current Facility-Administered Medications   Medication Dose Route Frequency Provider Last Rate Last Admin    sodium chloride 0.9 % infusion   Intravenous Continuous Elia Guy  mL/hr at 01/23/25 1221 New Bag at 01/23/25 1221     Current Facility-Administered Medications   Medication Dose Route Frequency Provider Last Rate Last Admin    acetaminophen (TYLENOL) tablet 975 mg  975 mg Oral Q8H Harsha Marx MD   975 mg at 01/24/25 0403    aspirin EC tablet 81 mg  81 mg Oral BID Harsha Marx MD   81 mg at 01/24/25 0816    donepezil (ARICEPT) tablet 10 mg  10 mg Oral At Bedtime Elia Guy MD   10 mg at 01/23/25 2100    [Held by provider] losartan (COZAAR) tablet 50 mg  50 mg Oral Daily Elia Guy MD        polyethylene glycol (MIRALAX) Packet 17 g  17 g Oral Daily Harsha Marx MD   17 g at 01/24/25 0816    senna-docusate (SENOKOT-S/PERICOLACE) 8.6-50 MG per tablet 1 tablet  1 tablet Oral BID Harsha Marx MD   1 tablet at 01/24/25 0816    sertraline (ZOLOFT) tablet  25 mg  25 mg Oral Daily Elia Guy MD   25 mg at 01/24/25 0816    simvastatin (ZOCOR) tablet 20 mg  20 mg Oral At Bedtime Elia Guy MD   20 mg at 01/23/25 2100    sodium chloride (PF) 0.9% PF flush 3 mL  3 mL Intracatheter Q8H Harsha Marx MD   3 mL at 01/24/25 0816

## 2025-01-24 NOTE — PLAN OF CARE
Problem: Adult Inpatient Plan of Care  Goal: Absence of Hospital-Acquired Illness or Injury  Intervention: Identify and Manage Fall Risk  Recent Flowsheet Documentation  Taken 1/24/2025 0000 by Court Syed RN  Safety Promotion/Fall Prevention:   safety round/check completed   room organization consistent   room near nurse's station   nonskid shoes/slippers when out of bed   mobility aid in reach   lighting adjusted   increase visualization of patient   increased rounding and observation   clutter free environment maintained     Problem: Adult Inpatient Plan of Care  Goal: Absence of Hospital-Acquired Illness or Injury  Intervention: Prevent Skin Injury  Recent Flowsheet Documentation  Taken 1/24/2025 0000 by Court Syed RN  Body Position: position changed independently   Goal Outcome Evaluation:      Plan of Care Reviewed With: patient    Overall Patient Progress: improvingOverall Patient Progress: improving  Patient vital signs are at baseline: Yes  Patient able to ambulate as they were prior to admission or with assist devices provided by therapies during their stay:  Yes  Patient MUST void prior to discharge:  Yes  Patient able to tolerate oral intake:  Yes  Pain has adequate pain control using Oral analgesics:  Yes  Does patient have an identified :  Yes  Has goal D/C date and time been discussed with patient:  Yes  Pt alert but forgetful, vss on room air, can voice needs, pain meds given, pt ambulated and voided, potential discharge today if medically well

## 2025-01-24 NOTE — PLAN OF CARE
Note from 0700 to discharge. Discharge paperwork addressed thoroughly with pt and daughter. Questions answered and addressed at length by writer. AVS sent with pt. IV access discontinued. No issues noted. VSS, no shortness of breath.    Patient vital signs are at baseline: Yes  Patient able to ambulate as they were prior to admission or with assist devices provided by therapies during their stay:  Yes  Patient MUST void prior to discharge:  Yes  Patient able to tolerate oral intake:  Yes  Pain has adequate pain control using Oral analgesics:  Yes  Does patient have an identified :  Yes  Has goal D/C date and time been discussed with patient:  Yes    Bessie Reyes RN

## 2025-01-24 NOTE — PROGRESS NOTES
"[facility]opaedics Progress Note      Post-operative Day: 1 Day Post-Op    Procedure(s):  RIGHT TOTAL KNEE ARTHROPLASTY      Subjective: Isela is seen up resting in bedside chair, daughter present in room. Per daughter patient slightly more confused then baseline. Endorses pain to right knee, has been managed with PO analgesics. Is tolerating a regular diet with no nausea or vomiting. Voiding without difficulty and passing gas.     Pain: moderate  Chest pain, SOB:  No      Objective:  Blood pressure (!) 152/67, pulse 69, temperature 99  F (37.2  C), temperature source Oral, resp. rate 16, height 1.651 m (5' 5\"), weight 70.3 kg (155 lb), SpO2 94%.    Patient Vitals for the past 24 hrs:   BP Temp Temp src Pulse Resp SpO2   01/24/25 1012 (!) 152/67 99  F (37.2  C) Oral 69 16 94 %   01/23/25 2330 (!) 141/66 98.3  F (36.8  C) Oral 71 17 93 %   01/23/25 1930 130/82 99  F (37.2  C) Oral 92 16 93 %   01/23/25 1500 129/77 97.6  F (36.4  C) Oral 86 20 98 %   01/23/25 1400 128/72 97.9  F (36.6  C) Oral 82 18 97 %   01/23/25 1300 133/73 97.6  F (36.4  C) Oral 92 20 96 %   01/23/25 1230 124/60 97.9  F (36.6  C) Oral 82 18 96 %   01/23/25 1200 123/59 97.6  F (36.4  C) Oral 91 16 96 %   01/23/25 1130 138/64 -- -- 87 16 96 %   01/23/25 1118 -- -- -- 84 -- 96 %       Wt Readings from Last 4 Encounters:   01/23/25 70.3 kg (155 lb)   08/13/22 65.8 kg (145 lb)   07/14/19 71 kg (156 lb 8 oz)   06/17/11 81.3 kg (179 lb 3.7 oz)       General: Alert and orientated, NAD  Respiratory: Non-labored breathing on RA  RLE motor function, sensation, and circulation intact   Yes, Dorsiflexion/plantarflexion intact and equal bilaterally. Moves all other extremities with ease and purpose   Wound status: incisions are clean dry and intact. Yes  Calf tenderness: Bilateral  No    Pertinent Labs   Lab Results: personally reviewed.     Recent Labs   Lab Test 01/24/25  0647 01/23/25  1145 09/13/21  1614 07/17/20  1041 07/14/19  0439 07/13/19  0416 " 07/12/19  0836   WBC  --   --   --   --   --   --  6.1   HGB 9.0*  --   --   --  9.2* 9.2* 12.5   HCT  --   --   --   --   --   --  36.3   MCV  --   --   --   --   --   --  83   PLT  --   --   --   --   --   --  246    133* 135*   < >  --   --   --     < > = values in this interval not displayed.       Plan:   Anticoagulation protocol: Aspirin 81 mg BID  x 40  days  Pain medications:  scopainmedication: oxycodone, tylenol, and Robaxin. Will avoid Atarax given confusion   Weight bearing status:  WBAT  Disposition:  Home today pending clearance from therapies and hospitalist    Continue cares and rehabilitation     Report completed by:  TANNER Ortega CNP  Date: 1/24/2025  Time: 11:17 AM

## 2025-02-03 ENCOUNTER — DOCUMENTATION ONLY (OUTPATIENT)
Dept: OTHER | Facility: CLINIC | Age: 80
End: 2025-02-03
Payer: COMMERCIAL

## (undated) DEVICE — SU ETHIBOND 1 CT-1 30" X425H

## (undated) DEVICE — GLOVE BIOGEL PI SZ 8.0 40880

## (undated) DEVICE — SOL WATER IRRIG 1000ML BOTTLE 2F7114

## (undated) DEVICE — SOL NACL 0.9% IRRIG 1000ML BOTTLE 2F7124

## (undated) DEVICE — BLADE SAW SAGITTAL STRK WIDE 25.4X85X1.2MM 2108-151-000

## (undated) DEVICE — GLOVE BIOGEL PI SZ 7.0 40870

## (undated) DEVICE — BLADE SAW SAGITTAL STRK DUAL CUT 4118-135-090

## (undated) DEVICE — SUCTION MANIFOLD NEPTUNE 2 SYS 4 PORT 0702-020-000

## (undated) DEVICE — SUTURE VICRYL+ 1 18 CT/CR  VLT VCP753D

## (undated) DEVICE — HOLDER LIMB VELCRO OR 0814-1533

## (undated) DEVICE — VIAL DECANTER STERILE WHITE DYNJDEC06

## (undated) DEVICE — SUTURE VICRYL+ 2-0 27IN CT-1 UND VCP259H

## (undated) DEVICE — CUSTOM PACK TOTAL KNEE ACCESSORY SOP5BTAHEA

## (undated) DEVICE — CUSTOM PACK TOTAL KNEE SOP5BTKHEC

## (undated) DEVICE — GLOVE BIOGEL PI INDICATOR 8.0 LF 41680

## (undated) DEVICE — ELECTRODE PATIENT RETURN ADULT L10 FT 2 PLATE CORD 0855C

## (undated) DEVICE — SOL IRR 0.9% NACL 1000ML URMTC LF 2B7124X

## (undated) DEVICE — STPL SKIN PROXIMATE 35 WIDE PMW35

## (undated) DEVICE — GLOVE BIOGEL INDICATOR 7.5 LF 41675

## (undated) DEVICE — HOOD SURG T7PLUS PEEL AWAY FACE SHIELD STRL LF 0416-801-100

## (undated) RX ORDER — DEXAMETHASONE SODIUM PHOSPHATE 10 MG/ML
INJECTION, SOLUTION INTRAMUSCULAR; INTRAVENOUS
Status: DISPENSED
Start: 2025-01-23

## (undated) RX ORDER — PROPOFOL 10 MG/ML
INJECTION, EMULSION INTRAVENOUS
Status: DISPENSED
Start: 2025-01-23

## (undated) RX ORDER — ONDANSETRON 2 MG/ML
INJECTION INTRAMUSCULAR; INTRAVENOUS
Status: DISPENSED
Start: 2025-01-23

## (undated) RX ORDER — GLYCOPYRROLATE 0.2 MG/ML
INJECTION, SOLUTION INTRAMUSCULAR; INTRAVENOUS
Status: DISPENSED
Start: 2025-01-23

## (undated) RX ORDER — PHENYLEPHRINE HYDROCHLORIDE 10 MG/ML
INJECTION INTRAVENOUS
Status: DISPENSED
Start: 2025-01-23

## (undated) RX ORDER — LIDOCAINE HYDROCHLORIDE 10 MG/ML
INJECTION, SOLUTION EPIDURAL; INFILTRATION; INTRACAUDAL; PERINEURAL
Status: DISPENSED
Start: 2025-01-23

## (undated) RX ORDER — FENTANYL CITRATE 50 UG/ML
INJECTION, SOLUTION INTRAMUSCULAR; INTRAVENOUS
Status: DISPENSED
Start: 2025-01-23